# Patient Record
Sex: MALE | Race: WHITE | NOT HISPANIC OR LATINO | Employment: OTHER | URBAN - METROPOLITAN AREA
[De-identification: names, ages, dates, MRNs, and addresses within clinical notes are randomized per-mention and may not be internally consistent; named-entity substitution may affect disease eponyms.]

---

## 2018-03-19 ENCOUNTER — APPOINTMENT (OUTPATIENT)
Dept: MRI IMAGING | Facility: HOSPITAL | Age: 68
DRG: 069 | End: 2018-03-19
Payer: COMMERCIAL

## 2018-03-19 ENCOUNTER — HOSPITAL ENCOUNTER (INPATIENT)
Facility: HOSPITAL | Age: 68
LOS: 4 days | Discharge: HOME | DRG: 069 | End: 2018-03-24
Attending: EMERGENCY MEDICINE | Admitting: ANESTHESIOLOGY
Payer: COMMERCIAL

## 2018-03-19 ENCOUNTER — APPOINTMENT (EMERGENCY)
Dept: CT IMAGING | Facility: HOSPITAL | Age: 68
DRG: 069 | End: 2018-03-19
Payer: COMMERCIAL

## 2018-03-19 ENCOUNTER — APPOINTMENT (OUTPATIENT)
Dept: NON INVASIVE DIAGNOSTICS | Facility: HOSPITAL | Age: 68
DRG: 069 | End: 2018-03-19
Payer: COMMERCIAL

## 2018-03-19 ENCOUNTER — APPOINTMENT (EMERGENCY)
Dept: RADIOLOGY | Facility: HOSPITAL | Age: 68
DRG: 069 | End: 2018-03-19
Payer: COMMERCIAL

## 2018-03-19 DIAGNOSIS — R20.0 LEFT SIDED NUMBNESS: Primary | ICD-10-CM

## 2018-03-19 DIAGNOSIS — I16.1 HYPERTENSIVE EMERGENCY: ICD-10-CM

## 2018-03-19 DIAGNOSIS — I10 ESSENTIAL HYPERTENSION: Chronic | ICD-10-CM

## 2018-03-19 DIAGNOSIS — G45.9 TIA (TRANSIENT ISCHEMIC ATTACK): ICD-10-CM

## 2018-03-19 DIAGNOSIS — R51.9 HEADACHE: ICD-10-CM

## 2018-03-19 PROBLEM — N17.9 AKI (ACUTE KIDNEY INJURY) (HCC): Status: ACTIVE | Noted: 2018-03-19

## 2018-03-19 LAB
ALBUMIN SERPL BCP-MCNC: 4.4 G/DL (ref 3.5–5)
ALP SERPL-CCNC: 67 U/L (ref 46–116)
ALT SERPL W P-5'-P-CCNC: 35 U/L (ref 12–78)
ANION GAP SERPL CALCULATED.3IONS-SCNC: 10 MMOL/L (ref 4–13)
ANION GAP SERPL CALCULATED.3IONS-SCNC: 11 MMOL/L (ref 4–13)
ANION GAP SERPL CALCULATED.3IONS-SCNC: 9 MMOL/L (ref 4–13)
APTT PPP: 27 SECONDS (ref 23–35)
AST SERPL W P-5'-P-CCNC: 24 U/L (ref 5–45)
BILIRUB SERPL-MCNC: 0.8 MG/DL (ref 0.2–1)
BUN SERPL-MCNC: 19 MG/DL (ref 5–25)
BUN SERPL-MCNC: 21 MG/DL (ref 5–25)
BUN SERPL-MCNC: 23 MG/DL (ref 5–25)
CALCIUM SERPL-MCNC: 10 MG/DL (ref 8.3–10.1)
CALCIUM SERPL-MCNC: 10.1 MG/DL (ref 8.3–10.1)
CALCIUM SERPL-MCNC: 9.3 MG/DL (ref 8.3–10.1)
CHLORIDE SERPL-SCNC: 100 MMOL/L (ref 100–108)
CHLORIDE SERPL-SCNC: 100 MMOL/L (ref 100–108)
CHLORIDE SERPL-SCNC: 98 MMOL/L (ref 100–108)
CHOLEST SERPL-MCNC: 234 MG/DL (ref 50–200)
CO2 SERPL-SCNC: 26 MMOL/L (ref 21–32)
CO2 SERPL-SCNC: 29 MMOL/L (ref 21–32)
CO2 SERPL-SCNC: 30 MMOL/L (ref 21–32)
CREAT SERPL-MCNC: 1.11 MG/DL (ref 0.6–1.3)
CREAT SERPL-MCNC: 1.24 MG/DL (ref 0.6–1.3)
CREAT SERPL-MCNC: 1.41 MG/DL (ref 0.6–1.3)
ERYTHROCYTE [DISTWIDTH] IN BLOOD BY AUTOMATED COUNT: 13.4 % (ref 11.6–15.1)
ERYTHROCYTE [DISTWIDTH] IN BLOOD BY AUTOMATED COUNT: 13.5 % (ref 11.6–15.1)
ERYTHROCYTE [SEDIMENTATION RATE] IN BLOOD: 2 MM/HOUR (ref 0–10)
EST. AVERAGE GLUCOSE BLD GHB EST-MCNC: 126 MG/DL
GFR SERPL CREATININE-BSD FRML MDRD: 52 ML/MIN/1.73SQ M
GFR SERPL CREATININE-BSD FRML MDRD: 61 ML/MIN/1.73SQ M
GFR SERPL CREATININE-BSD FRML MDRD: 69 ML/MIN/1.73SQ M
GLUCOSE SERPL-MCNC: 121 MG/DL (ref 65–140)
GLUCOSE SERPL-MCNC: 135 MG/DL (ref 65–140)
GLUCOSE SERPL-MCNC: 136 MG/DL (ref 65–140)
HBA1C MFR BLD: 6 % (ref 4.2–6.3)
HCT VFR BLD AUTO: 47.7 % (ref 36.5–49.3)
HCT VFR BLD AUTO: 52.2 % (ref 36.5–49.3)
HDLC SERPL-MCNC: 43 MG/DL (ref 40–60)
HGB BLD-MCNC: 16.2 G/DL (ref 12–17)
HGB BLD-MCNC: 17.3 G/DL (ref 12–17)
INR PPP: 0.92 (ref 0.86–1.16)
LDLC SERPL CALC-MCNC: 176 MG/DL (ref 0–100)
MCH RBC QN AUTO: 31.9 PG (ref 26.8–34.3)
MCH RBC QN AUTO: 32.3 PG (ref 26.8–34.3)
MCHC RBC AUTO-ENTMCNC: 33.1 G/DL (ref 31.4–37.4)
MCHC RBC AUTO-ENTMCNC: 34 G/DL (ref 31.4–37.4)
MCV RBC AUTO: 95 FL (ref 82–98)
MCV RBC AUTO: 96 FL (ref 82–98)
PLATELET # BLD AUTO: 220 THOUSANDS/UL (ref 149–390)
PLATELET # BLD AUTO: 220 THOUSANDS/UL (ref 149–390)
PLATELET # BLD AUTO: 250 THOUSANDS/UL (ref 149–390)
PMV BLD AUTO: 10.2 FL (ref 8.9–12.7)
PMV BLD AUTO: 10.3 FL (ref 8.9–12.7)
PMV BLD AUTO: 10.3 FL (ref 8.9–12.7)
POTASSIUM SERPL-SCNC: 4.1 MMOL/L (ref 3.5–5.3)
POTASSIUM SERPL-SCNC: 4.3 MMOL/L (ref 3.5–5.3)
POTASSIUM SERPL-SCNC: 4.5 MMOL/L (ref 3.5–5.3)
PROT SERPL-MCNC: 8.4 G/DL (ref 6.4–8.2)
PROTHROMBIN TIME: 12.6 SECONDS (ref 12.1–14.4)
RBC # BLD AUTO: 5.02 MILLION/UL (ref 3.88–5.62)
RBC # BLD AUTO: 5.42 MILLION/UL (ref 3.88–5.62)
SODIUM SERPL-SCNC: 137 MMOL/L (ref 136–145)
SODIUM SERPL-SCNC: 138 MMOL/L (ref 136–145)
SODIUM SERPL-SCNC: 138 MMOL/L (ref 136–145)
TRIGL SERPL-MCNC: 74 MG/DL
TROPONIN I SERPL-MCNC: 0.04 NG/ML
TROPONIN I SERPL-MCNC: <0.02 NG/ML
WBC # BLD AUTO: 8.06 THOUSAND/UL (ref 4.31–10.16)
WBC # BLD AUTO: 9.85 THOUSAND/UL (ref 4.31–10.16)

## 2018-03-19 PROCEDURE — 93005 ELECTROCARDIOGRAM TRACING: CPT

## 2018-03-19 PROCEDURE — G8979 MOBILITY GOAL STATUS: HCPCS

## 2018-03-19 PROCEDURE — 83036 HEMOGLOBIN GLYCOSYLATED A1C: CPT | Performed by: PHYSICIAN ASSISTANT

## 2018-03-19 PROCEDURE — G8988 SELF CARE GOAL STATUS: HCPCS

## 2018-03-19 PROCEDURE — 96375 TX/PRO/DX INJ NEW DRUG ADDON: CPT

## 2018-03-19 PROCEDURE — 97166 OT EVAL MOD COMPLEX 45 MIN: CPT

## 2018-03-19 PROCEDURE — 85652 RBC SED RATE AUTOMATED: CPT | Performed by: PSYCHIATRY & NEUROLOGY

## 2018-03-19 PROCEDURE — 99220 PR INITIAL OBSERVATION CARE/DAY 70 MINUTES: CPT | Performed by: PHYSICIAN ASSISTANT

## 2018-03-19 PROCEDURE — 4A133B1 MONITORING OF ARTERIAL PRESSURE, PERIPHERAL, PERCUTANEOUS APPROACH: ICD-10-PCS | Performed by: ANESTHESIOLOGY

## 2018-03-19 PROCEDURE — 93306 TTE W/DOPPLER COMPLETE: CPT

## 2018-03-19 PROCEDURE — 85610 PROTHROMBIN TIME: CPT | Performed by: EMERGENCY MEDICINE

## 2018-03-19 PROCEDURE — 97163 PT EVAL HIGH COMPLEX 45 MIN: CPT

## 2018-03-19 PROCEDURE — 71046 X-RAY EXAM CHEST 2 VIEWS: CPT

## 2018-03-19 PROCEDURE — 85027 COMPLETE CBC AUTOMATED: CPT | Performed by: EMERGENCY MEDICINE

## 2018-03-19 PROCEDURE — 84484 ASSAY OF TROPONIN QUANT: CPT | Performed by: EMERGENCY MEDICINE

## 2018-03-19 PROCEDURE — 99285 EMERGENCY DEPT VISIT HI MDM: CPT

## 2018-03-19 PROCEDURE — 36415 COLL VENOUS BLD VENIPUNCTURE: CPT | Performed by: EMERGENCY MEDICINE

## 2018-03-19 PROCEDURE — 80053 COMPREHEN METABOLIC PANEL: CPT | Performed by: EMERGENCY MEDICINE

## 2018-03-19 PROCEDURE — 99254 IP/OBS CNSLTJ NEW/EST MOD 60: CPT | Performed by: PHYSICAL MEDICINE & REHABILITATION

## 2018-03-19 PROCEDURE — 85049 AUTOMATED PLATELET COUNT: CPT | Performed by: PHYSICIAN ASSISTANT

## 2018-03-19 PROCEDURE — 03HY32Z INSERTION OF MONITORING DEVICE INTO UPPER ARTERY, PERCUTANEOUS APPROACH: ICD-10-PCS | Performed by: ANESTHESIOLOGY

## 2018-03-19 PROCEDURE — G8978 MOBILITY CURRENT STATUS: HCPCS

## 2018-03-19 PROCEDURE — 99254 IP/OBS CNSLTJ NEW/EST MOD 60: CPT | Performed by: PSYCHIATRY & NEUROLOGY

## 2018-03-19 PROCEDURE — G8987 SELF CARE CURRENT STATUS: HCPCS

## 2018-03-19 PROCEDURE — 4A133J1 MONITORING OF ARTERIAL PULSE, PERIPHERAL, PERCUTANEOUS APPROACH: ICD-10-PCS | Performed by: ANESTHESIOLOGY

## 2018-03-19 PROCEDURE — 84484 ASSAY OF TROPONIN QUANT: CPT | Performed by: NURSE PRACTITIONER

## 2018-03-19 PROCEDURE — 80048 BASIC METABOLIC PNL TOTAL CA: CPT | Performed by: NURSE PRACTITIONER

## 2018-03-19 PROCEDURE — 85730 THROMBOPLASTIN TIME PARTIAL: CPT | Performed by: EMERGENCY MEDICINE

## 2018-03-19 PROCEDURE — 85027 COMPLETE CBC AUTOMATED: CPT | Performed by: PHYSICIAN ASSISTANT

## 2018-03-19 PROCEDURE — 80048 BASIC METABOLIC PNL TOTAL CA: CPT | Performed by: PHYSICIAN ASSISTANT

## 2018-03-19 PROCEDURE — 96374 THER/PROPH/DIAG INJ IV PUSH: CPT

## 2018-03-19 PROCEDURE — 80061 LIPID PANEL: CPT | Performed by: PHYSICIAN ASSISTANT

## 2018-03-19 PROCEDURE — 70498 CT ANGIOGRAPHY NECK: CPT

## 2018-03-19 PROCEDURE — 99254 IP/OBS CNSLTJ NEW/EST MOD 60: CPT | Performed by: INTERNAL MEDICINE

## 2018-03-19 PROCEDURE — 70496 CT ANGIOGRAPHY HEAD: CPT

## 2018-03-19 RX ORDER — OXYCODONE HYDROCHLORIDE AND ACETAMINOPHEN 5; 325 MG/1; MG/1
1 TABLET ORAL ONCE
Status: COMPLETED | OUTPATIENT
Start: 2018-03-19 | End: 2018-03-19

## 2018-03-19 RX ORDER — NITROGLYCERIN 20 MG/100ML
5-200 INJECTION INTRAVENOUS
Status: DISCONTINUED | OUTPATIENT
Start: 2018-03-19 | End: 2018-03-19

## 2018-03-19 RX ORDER — ASPIRIN 325 MG
325 TABLET ORAL ONCE
Status: COMPLETED | OUTPATIENT
Start: 2018-03-19 | End: 2018-03-19

## 2018-03-19 RX ORDER — ONDANSETRON 2 MG/ML
4 INJECTION INTRAMUSCULAR; INTRAVENOUS EVERY 6 HOURS PRN
Status: DISCONTINUED | OUTPATIENT
Start: 2018-03-19 | End: 2018-03-24 | Stop reason: HOSPADM

## 2018-03-19 RX ORDER — HYDRALAZINE HYDROCHLORIDE 20 MG/ML
10 INJECTION INTRAMUSCULAR; INTRAVENOUS EVERY 4 HOURS PRN
Status: DISCONTINUED | OUTPATIENT
Start: 2018-03-19 | End: 2018-03-19

## 2018-03-19 RX ORDER — MORPHINE SULFATE 2 MG/ML
2 INJECTION, SOLUTION INTRAMUSCULAR; INTRAVENOUS ONCE
Status: DISCONTINUED | OUTPATIENT
Start: 2018-03-19 | End: 2018-03-21

## 2018-03-19 RX ORDER — HEPARIN SODIUM 5000 [USP'U]/ML
5000 INJECTION, SOLUTION INTRAVENOUS; SUBCUTANEOUS EVERY 8 HOURS SCHEDULED
Status: DISCONTINUED | OUTPATIENT
Start: 2018-03-19 | End: 2018-03-24 | Stop reason: HOSPADM

## 2018-03-19 RX ORDER — ASPIRIN 325 MG
325 TABLET ORAL DAILY
Status: DISCONTINUED | OUTPATIENT
Start: 2018-03-19 | End: 2018-03-24 | Stop reason: HOSPADM

## 2018-03-19 RX ORDER — LIDOCAINE HYDROCHLORIDE 10 MG/ML
1 INJECTION, SOLUTION EPIDURAL; INFILTRATION; INTRACAUDAL; PERINEURAL ONCE
Status: COMPLETED | OUTPATIENT
Start: 2018-03-19 | End: 2018-03-19

## 2018-03-19 RX ORDER — SODIUM CHLORIDE 9 MG/ML
75 INJECTION, SOLUTION INTRAVENOUS CONTINUOUS
Status: DISCONTINUED | OUTPATIENT
Start: 2018-03-19 | End: 2018-03-20

## 2018-03-19 RX ORDER — ACETAMINOPHEN 325 MG/1
650 TABLET ORAL EVERY 4 HOURS PRN
Status: DISCONTINUED | OUTPATIENT
Start: 2018-03-19 | End: 2018-03-22

## 2018-03-19 RX ORDER — LIDOCAINE HYDROCHLORIDE 10 MG/ML
1 INJECTION, SOLUTION INFILTRATION; PERINEURAL ONCE
Status: DISCONTINUED | OUTPATIENT
Start: 2018-03-19 | End: 2018-03-19

## 2018-03-19 RX ORDER — NITROGLYCERIN 0.4 MG/1
0.4 TABLET SUBLINGUAL
Status: DISCONTINUED | OUTPATIENT
Start: 2018-03-19 | End: 2018-03-24 | Stop reason: HOSPADM

## 2018-03-19 RX ORDER — ATORVASTATIN CALCIUM 40 MG/1
40 TABLET, FILM COATED ORAL EVERY EVENING
Status: DISCONTINUED | OUTPATIENT
Start: 2018-03-19 | End: 2018-03-24 | Stop reason: HOSPADM

## 2018-03-19 RX ORDER — LIDOCAINE HYDROCHLORIDE 10 MG/ML
INJECTION, SOLUTION EPIDURAL; INFILTRATION; INTRACAUDAL; PERINEURAL
Status: COMPLETED
Start: 2018-03-19 | End: 2018-03-19

## 2018-03-19 RX ORDER — LABETALOL HYDROCHLORIDE 5 MG/ML
10 INJECTION, SOLUTION INTRAVENOUS ONCE
Status: COMPLETED | OUTPATIENT
Start: 2018-03-19 | End: 2018-03-19

## 2018-03-19 RX ORDER — METOCLOPRAMIDE HYDROCHLORIDE 5 MG/ML
10 INJECTION INTRAMUSCULAR; INTRAVENOUS ONCE
Status: COMPLETED | OUTPATIENT
Start: 2018-03-19 | End: 2018-03-19

## 2018-03-19 RX ORDER — OXYCODONE HYDROCHLORIDE AND ACETAMINOPHEN 5; 325 MG/1; MG/1
1 TABLET ORAL EVERY 4 HOURS PRN
Status: DISCONTINUED | OUTPATIENT
Start: 2018-03-19 | End: 2018-03-20

## 2018-03-19 RX ADMIN — HEPARIN SODIUM 5000 UNITS: 5000 INJECTION, SOLUTION INTRAVENOUS; SUBCUTANEOUS at 15:46

## 2018-03-19 RX ADMIN — SODIUM CHLORIDE 5 MG/HR: 0.9 INJECTION, SOLUTION INTRAVENOUS at 19:17

## 2018-03-19 RX ADMIN — ACETAMINOPHEN 650 MG: 325 TABLET, FILM COATED ORAL at 18:29

## 2018-03-19 RX ADMIN — ACETAMINOPHEN 650 MG: 325 TABLET, FILM COATED ORAL at 13:14

## 2018-03-19 RX ADMIN — HYDRALAZINE HYDROCHLORIDE 10 MG: 20 INJECTION INTRAMUSCULAR; INTRAVENOUS at 13:23

## 2018-03-19 RX ADMIN — IODIXANOL 100 ML: 320 INJECTION, SOLUTION INTRAVASCULAR at 01:53

## 2018-03-19 RX ADMIN — OXYCODONE HYDROCHLORIDE AND ACETAMINOPHEN 1 TABLET: 5; 325 TABLET ORAL at 20:07

## 2018-03-19 RX ADMIN — SODIUM CHLORIDE 75 ML/HR: 0.9 INJECTION, SOLUTION INTRAVENOUS at 06:29

## 2018-03-19 RX ADMIN — METOCLOPRAMIDE 10 MG: 5 INJECTION, SOLUTION INTRAMUSCULAR; INTRAVENOUS at 01:12

## 2018-03-19 RX ADMIN — LABETALOL 20 MG/4 ML (5 MG/ML) INTRAVENOUS SYRINGE 10 MG: at 01:06

## 2018-03-19 RX ADMIN — ASPIRIN 325 MG: 325 TABLET ORAL at 03:26

## 2018-03-19 RX ADMIN — OXYCODONE HYDROCHLORIDE AND ACETAMINOPHEN 1 TABLET: 5; 325 TABLET ORAL at 19:16

## 2018-03-19 RX ADMIN — HYDROMORPHONE HYDROCHLORIDE 0.5 MG: 1 INJECTION, SOLUTION INTRAMUSCULAR; INTRAVENOUS; SUBCUTANEOUS at 03:27

## 2018-03-19 RX ADMIN — NITROGLYCERIN 10 MCG/MIN: 20 INJECTION INTRAVENOUS at 16:50

## 2018-03-19 RX ADMIN — METOPROLOL TARTRATE 25 MG: 25 TABLET ORAL at 08:28

## 2018-03-19 RX ADMIN — LIDOCAINE HYDROCHLORIDE 1 ML: 10 INJECTION, SOLUTION EPIDURAL; INFILTRATION; INTRACAUDAL; PERINEURAL at 18:20

## 2018-03-19 RX ADMIN — SODIUM CHLORIDE 75 ML/HR: 0.9 INJECTION, SOLUTION INTRAVENOUS at 19:28

## 2018-03-19 RX ADMIN — ATORVASTATIN CALCIUM 40 MG: 40 TABLET, FILM COATED ORAL at 18:29

## 2018-03-19 NOTE — PHYSICAL THERAPY NOTE
Physical Therapy Cancellation Note    PT order received  Chart review performed  At this time, PT evaluation cancelled presently 2* elevated BP, discussed with OMAR Parker who is in process of administering meds to pt at this time  PT will follow and evaluate as appropriate      Dickson Spangler, PT, DPT

## 2018-03-19 NOTE — CONSULTS
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 o  year old male who has a history of CKD3, HTN, HLD presenting with accelerated BP in the setting of chest pain and stroke-like symptoms  Had headaches for 2 weeks, developed substernal chest pressure with L face and L arm numbness and tingling  Per wife, patient also had some slurred speech  Has occasional leg swelling  Denies SOB, palpitations  No prior history of CVA, no cardiac history  Patient arrived in ED with /134, but BP has been labile since admission today  Patient had diffuse ischemic EKG changes after receiving hydralazine  Has been compliant on medications at home  REVIEW OF SYSTEMS:  Constitutional:  Denies fever or chills   Eyes:  Denies change in visual acuity   HENT:  Denies nasal congestion or sore throat   Respiratory:  Denies cough or shortness of breath   Cardiovascular:  +chest pain  +leg swelling  GI:  Denies abdominal pain, nausea, vomiting, bloody stools or diarrhea   :  Denies dysuria, frequency, difficulty in micturition and nocturia  Musculoskeletal:  Denies back pain or joint pain   Neurologic:  +headache, +L arm numbness/tingling  Endocrine:  Denies polyuria or polydipsia   Lymphatic:  Denies swollen glands   Psychiatric:  Denies depression or anxiety     Historical Information   Past Medical History:   Diagnosis Date    Hyperlipidemia      History reviewed  No pertinent surgical history  History   Alcohol Use No     History   Drug Use No     History   Smoking Status    Never Smoker   Smokeless Tobacco    Never Used       Family History: History reviewed   No pertinent family history  MEDS & ALLERGIES:  all current active meds have been reviewed and current meds: Current Facility-Administered Medications   Medication Dose Route Frequency    acetaminophen (TYLENOL) tablet 650 mg  650 mg Oral Q4H PRN    aspirin tablet 325 mg  325 mg Oral Daily    atorvastatin (LIPITOR) tablet 40 mg  40 mg Oral QPM    heparin (porcine) subcutaneous injection 5,000 Units  5,000 Units Subcutaneous Q8H [facility]    metoprolol tartrate (LOPRESSOR) tablet 25 mg  25 mg Oral Q12H [facility]    ondansetron (ZOFRAN) injection 4 mg  4 mg Intravenous Q6H PRN    sodium chloride 0 9 % infusion  75 mL/hr Intravenous Continuous       sodium chloride 75 mL/hr Last Rate: 75 mL/hr (03/19/18 0629)     Allergies   Allergen Reactions    Valium [Diazepam] Dizziness       OBJECTIVE:  Vitals:   Vitals:    03/19/18 1414   BP: (!) 187/94   Pulse: 74   Resp: (!) 26   Temp:    SpO2: 98%     Body mass index is 48 94 kg/m²  Systolic (81KYM), NPI:418 , Min:147 , XPM:239     Diastolic (89CCO), XOI:71, Min:76, Max:134    No intake or output data in the 24 hours ending 03/19/18 1544  Weight (last 2 days)     Date/Time   Weight    03/19/18 0037  (!)  146 (321 87)            Invasive Devices     Peripheral Intravenous Line            Peripheral IV 03/19/18 Left Arm less than 1 day                PHYSICAL EXAMS:  General:  Patient is not in acute distress, laying in the bed comfortably, awake, alert responding to commands  Head: Normocephalic, Atraumatic  HEENT: White sclera, pink conjunctiva,  PERRLA,pharynx benign  Neck:  Supple, no neck vein distention, carotids+2/+2 no bruits, thyromegaly, adenopathy  Respiratory: clear to P/A  Cardiovascular:  PMI normal, S1-S2 normal, No  Murmurs, thrills, gallops, rubs   Regular rhythm  GI:  Abdomen soft nontender   No hepatosplenomegaly, adenopathy, ascites,or rebound tenderness  Extremities: No edema, normal pulses, no calf tenderness, no joint deformities, no venous disease Integument:  No skin rashes or ulceration  Lymphatic:  No cervical or inguinal lymphadenopathy  Neurologic:  Patient is awake alert, responding to command, well-oriented to time and place and person moving all extremities    LABORATORY RESULTS:    Results from last 7 days  Lab Units 03/19/18  0104   TROPONIN I ng/mL <0 02     CBC with diff:   Results from last 7 days  Lab Units 03/19/18  0627 03/19/18  0104   WBC Thousand/uL 8 06 9 85   HEMOGLOBIN g/dL 16 2 17 3*   HEMATOCRIT % 47 7 52 2*   MCV fL 95 96   PLATELETS Thousands/uL 220  220 250   MCH pg 32 3 31 9   MCHC g/dL 34 0 33 1   RDW % 13 5 13 4   MPV fL 10 3  10 3 10 2       CMP:  Results from last 7 days  Lab Units 03/19/18  0627 03/19/18  0104   SODIUM mmol/L 138 138   POTASSIUM mmol/L 4 3 4 1   CHLORIDE mmol/L 100 98*   CO2 mmol/L 29 30   ANION GAP mmol/L 9 10   BUN mg/dL 21 23   CREATININE mg/dL 1 24 1 41*   GLUCOSE RANDOM mg/dL 135 136   CALCIUM mg/dL 9 3 10 1   AST U/L  --  24   ALT U/L  --  35   ALK PHOS U/L  --  67   TOTAL PROTEIN g/dL  --  8 4*   BILIRUBIN TOTAL mg/dL  --  0 80   EGFR ml/min/1 73sq m 61 52       BMP:  Results from last 7 days  Lab Units 03/19/18  0627 03/19/18  0104   SODIUM mmol/L 138 138   POTASSIUM mmol/L 4 3 4 1   CHLORIDE mmol/L 100 98*   CO2 mmol/L 29 30   BUN mg/dL 21 23   CREATININE mg/dL 1 24 1 41*   GLUCOSE RANDOM mg/dL 135 136   CALCIUM mg/dL 9 3 10 1                  Results from last 7 days  Lab Units 03/19/18  0627   HEMOGLOBIN A1C % 6 0           Results from last 7 days  Lab Units 03/19/18  0104   INR  0 92       Lipid Profile:   Lab Results   Component Value Date    CHOL 234 (H) 03/19/2018     Lab Results   Component Value Date    HDL 43 03/19/2018     Lab Results   Component Value Date    LDLCALC 176 (H) 03/19/2018     Lab Results   Component Value Date    TRIG 74 03/19/2018       Cardiac testing:   No results found for this or any previous visit  No results found for this or any previous visit    No procedure found  No results found for this or any previous visit  Imaging:   I have personally reviewed pertinent reports  EKG reviewed personally:  1st EKG showed flipped T waves in leads I and aVL, repeat EKG shows diffuse T wave inversions in lateral and inferior leads  Assessment/Plan:  1  HTN emergency: BP initially peaked at 270/134  Last recorded /94  Will be in ICU for further management  Recommend nitroglycerin drip  Recommend Coreg 12 5 mg BID  Recommend renal doppler  2  Chest pain: Ischemic EKG changes as above  Initial troponin negative, continue to trend  Chest pain likely secondary to HTN emergency  Will need BP control  ECHO ordered, will assess EF and regional wall motion abnormalities  Further ischemic workup after BP is controlled  Continue ASA, statin  3  Stroke-like symptoms: Per neurology, TIA vs complicated migraine  Neurology following, MRI pending  HTN emergency may be a contributing factor  ECHO ordered as above, no evidence of A Fib thus far  Continue ASA, statin  4  HLD: Continue statin    Code Status: Level 1 - Full Code    Counseling / Coordination of Care  Total floor / unit time spent today 35 minutes  Greater than 50% of total time was spent with the patient and / or family counseling and / or coordination of care  A description of the counseling / coordination of care: Review of history, current assessment, development of a plan      Kathya Suero PA-C  6/66/6483,6:60 PM

## 2018-03-19 NOTE — SPEECH THERAPY NOTE
Consult received, chart reviewed  Pt initially presented with s/sx concerning for CVA, however, head CT is negative for acute infarct, current working dx per neurology is TIA vs  complex migraine  D/w OMAR Crabtree who reports that pt passed dysphagia screening and p/w no apparent deficits in speech, language, or swallowing  Pt likewise denies related complaints and p/w no obvious deficits on bedside screening  Full ST eval is not warranted at this time  Please reconsult with any concerns

## 2018-03-19 NOTE — ED PROVIDER NOTES
History  Chief Complaint   Patient presents with    Numbness     pt co of L face and hand numbness onset tonight about 9pm  "i have had a headache for about 2 weeks now"  74 y o  male presents with 3 weeks of right sided frontal headache without radiation  Described as severe stabbing that waxes and wanes and continues in the ER  Patient states food worsens the pain and acetaminophen improves the pain  Patient denies aura  Patient denies maximal intensity of the headache within minutes of onset  Patient denies exertional component to the headache  Patient notes numbness in the left hand and face that started in the thumb and lasted several minutes around 8pm and resolved, currently no numbness  Patient taking excessive amounts of acetaminophen  ROS: patient affirms right sided chest pain earlier that has improved and gait difficulty that he describes as "stubbling"; denies fever/chills, seizure, weight loss, confusion, focal weakness, diaphoresis, visual changes, photophobia, nausea/vomiting, neck pain/stiffness, diarrhea, facial pain, speech difficulty, weakness, vertigo, lightheadedness, jaw claudication, syncope  Patient denies any concerns for CO exposure, recent tick bites, cervical manipulation, or trauma  Patient denies any use of illicit drugs  Patient affirms a history of remote headaches as a child  Patient denies any history of connective tissue disorders  Patient denies any history or family history of SAH  Patient denies any history of immunocompromised state  Objective:   PHYSICAL EXAM:   Constitutional : Non-toxic  Well developed, well-nourished with no acute distress   Eyes: PERRL, EOMI  Normal fundoscopic exam with no signs of papilledema or retinal hemorrhage  No nystagmus with gaze fixation  HENT: Atraumatic  Pharynx moist and non-erythematous  No tenderness or swelling over the temporal arteries     Neck: no carotid bruit, no tenderness to palpitation   CV: Regular rate and rhythm, no murmur  Peripheral pulses intact   Respiratory: Lungs clear to auscultation bilaterally   Abdomen: Soft, non-tender, non-distended  Back: No vertebral tenderness   Skin: Normal color, warm and dry   Extremities: Non-tender, no pedal edema  Neuro: Alert and answers questions appropriately  Normal tandem gait, including toe walking and heel walking  Normal Romberg exam  No pronator drift  Normal finger to nose  Normal fine motor function with rapid finger movements  Normal hand tap  Cranial nerves II through XII grossly intact  Visual fields grossly intact  Upper and lower motor strength 5/5 and symmetric  Normal light touch sensory exam  Normal DTRs  Medical Decision Making   62-year-old male presenting with headache associated with intermittent neurologic findings  Patient states headache has acutely worsening today that has been progressive over the past 2 weeks  As such, will obtain CTA of patient's head and neck to evaluate for potential intracranial process or vasculopathy including vertebral dissection that could be affecting patient's symptoms  Patient's NIH score is currently 0, as such patient is not a candidate for IV thrombolytics as his neurologic symptoms have resolved prior to my evaluation  Will treat patient symptomatically for his headache including antihypertensives though will tolerate permissive hypertension without significant reduction in overall systolic considering potential for intracranial process and presuming no intracranial hemorrhage found on CT imaging  Will admit for continued monitoring and evaluation considering possible TIA and hypertensive emergency  None       Past Medical History:   Diagnosis Date    Hyperlipidemia        History reviewed  No pertinent surgical history  History reviewed  No pertinent family history  I have reviewed and agree with the history as documented      Social History   Substance Use Topics    Smoking status: Never Smoker    Smokeless tobacco: Never Used    Alcohol use No        Review of Systems   All other systems reviewed and are negative        Physical Exam  ED Triage Vitals   Temperature Pulse Respirations Blood Pressure SpO2   03/19/18 0037 03/19/18 0037 03/19/18 0037 03/19/18 0038 03/19/18 0038   98 °F (36 7 °C) 70 20 (!) 270/134 95 %      Temp Source Heart Rate Source Patient Position - Orthostatic VS BP Location FiO2 (%)   03/19/18 0037 03/19/18 0037 03/19/18 0037 03/19/18 0037 --   Oral Monitor Lying Right arm       Pain Score       03/19/18 0037       No Pain           Orthostatic Vital Signs  Vitals:    03/21/18 0709 03/21/18 0915 03/21/18 1108 03/21/18 1500   BP: (!) 202/87 (!) 183/76 (!) 185/79 (!) 184/83   Pulse: 88 74 89 96   Patient Position - Orthostatic VS: Lying  Lying        Physical Exam    ED Medications  Medications   atorvastatin (LIPITOR) tablet 40 mg (40 mg Oral Given 3/21/18 1743)   ondansetron (ZOFRAN) injection 4 mg (not administered)   acetaminophen (TYLENOL) tablet 650 mg (650 mg Oral Given 3/19/18 1829)   aspirin tablet 325 mg (325 mg Oral Given 3/21/18 0917)   heparin (porcine) subcutaneous injection 5,000 Units (5,000 Units Subcutaneous Given 3/21/18 1322)   nitroglycerin (NITROSTAT) SL tablet 0 4 mg (not administered)   LORazepam (ATIVAN) 2 mg/mL injection **AcuDose Override Pull** (  Not Given 3/20/18 1229)   acetaminophen (TYLENOL) tablet 650 mg (650 mg Oral Given 3/21/18 1743)   amLODIPine (NORVASC) tablet 5 mg (5 mg Oral Not Given 3/21/18 0810)   hydrALAZINE (APRESOLINE) injection 5 mg ( Intravenous MAR Hold 3/21/18 1507)   labetalol (NORMODYNE) injection 10 mg (10 mg Intravenous Given 3/19/18 0106)   metoclopramide (REGLAN) injection 10 mg (10 mg Intravenous Given 3/19/18 0112)   iodixanol (VISIPAQUE) 320 MG/ML injection 100 mL (100 mL Intravenous Given 3/19/18 2797)   HYDROmorphone (DILAUDID) injection 0 5 mg (0 5 mg Intravenous Given 3/19/18 7093)   aspirin tablet 325 mg (325 mg Oral Given 3/19/18 0326)   lidocaine (PF) (XYLOCAINE-MPF) 1 % injection 1 mL (1 mL Infiltration Given 3/19/18 1820)   oxyCODONE-acetaminophen (PERCOCET) 5-325 mg per tablet 1 tablet (1 tablet Oral Given 3/19/18 2007)   LORazepam (ATIVAN) 2 mg/mL injection 0 5 mg (0 5 mg Intravenous Given 3/20/18 1101)       Diagnostic Studies  Results Reviewed     Procedure Component Value Units Date/Time    Troponin I [[id number]]  (Normal) Collected:  03/19/18 2238    Lab Status:  Final result Specimen:  Blood from Line, Arterial Updated:  03/19/18 2316     Troponin I 0 04 ng/mL     Narrative:         [employer] Chemistry analyzer 99% cutoff is > 0 04 ng/mL in network labs    o cTnI 99% cutoff is useful only when applied to patients in the clinical setting of myocardial ischemia  o cTnI 99% cutoff should be interpreted in the context of clinical history, ECG findings and possibly cardiac imaging to establish correct diagnosis  o cTnI 99% cutoff may be suggestive but clearly not indicative of a coronary event without the clinical setting of myocardial ischemia  Troponin I [[id number]]  (Normal) Collected:  03/19/18 1942    Lab Status:  Final result Specimen:  Blood Updated:  03/19/18 2002     Troponin I <0 02 ng/mL     Narrative:         [employer] Chemistry analyzer 99% cutoff is > 0 04 ng/mL in network labs    o cTnI 99% cutoff is useful only when applied to patients in the clinical setting of myocardial ischemia  o cTnI 99% cutoff should be interpreted in the context of clinical history, ECG findings and possibly cardiac imaging to establish correct diagnosis  o cTnI 99% cutoff may be suggestive but clearly not indicative of a coronary event without the clinical setting of myocardial ischemia      Troponin I [[id number]]  (Normal) Collected:  03/19/18 1546    Lab Status:  Final result Specimen:  Blood from Arm, Left Updated:  03/19/18 1621     Troponin I <0 02 ng/mL     Narrative:         [employer] Chemistry analyzer 99% cutoff is > 0 04 ng/mL in network labs    o cTnI 99% cutoff is useful only when applied to patients in the clinical setting of myocardial ischemia  o cTnI 99% cutoff should be interpreted in the context of clinical history, ECG findings and possibly cardiac imaging to establish correct diagnosis  o cTnI 99% cutoff may be suggestive but clearly not indicative of a coronary event without the clinical setting of myocardial ischemia  Basic metabolic panel [[id number]] Collected:  03/19/18 1546    Lab Status:  Final result Specimen:  Blood from Arm, Left Updated:  03/19/18 1608     Sodium 137 mmol/L      Potassium 4 5 mmol/L      Chloride 100 mmol/L      CO2 26 mmol/L      Anion Gap 11 mmol/L      BUN 19 mg/dL      Creatinine 1 11 mg/dL      Glucose 121 mg/dL      Calcium 10 0 mg/dL      eGFR 69 ml/min/1 73sq m     Narrative:         National Kidney Disease Education Program recommendations are as follows:  GFR calculation is accurate only with a steady state creatinine  Chronic Kidney disease less than 60 ml/min/1 73 sq  meters  Kidney failure less than 15 ml/min/1 73 sq  meters      Hemoglobin A1c w/EAG Estimation [[id number]]  (Normal) Collected:  03/19/18 0627    Lab Status:  Final result Specimen:  Blood from Arm, Left Updated:  03/19/18 1245     Hemoglobin A1C 6 0 %       mg/dl     Sedimentation rate, automated [[id number]]  (Normal) Collected:  03/19/18 1034    Lab Status:  Final result Specimen:  Blood from Arm, Left Updated:  03/19/18 1138     Sed Rate 2 mm/hour     Lipid Panel with Direct LDL reflex [[id number]]  (Abnormal) Collected:  03/19/18 2097    Lab Status:  Final result Specimen:  Blood from Arm, Left Updated:  03/19/18 0657     Cholesterol 234 (H) mg/dL      Triglycerides 74 mg/dL      HDL, Direct 43 mg/dL      LDL Calculated 176 (H) mg/dL     Narrative:         Triglyceride:        Normal               <150 mg/dl        Borderline High     150-199 mg/dl        High               200-499 mg/dl        Very High           >499 mg/dl      Basic metabolic panel [[id number]] Collected:  03/19/18 8670    Lab Status:  Final result Specimen:  Blood from Arm, Left Updated:  03/19/18 8671     Sodium 138 mmol/L      Potassium 4 3 mmol/L      Chloride 100 mmol/L      CO2 29 mmol/L      Anion Gap 9 mmol/L      BUN 21 mg/dL      Creatinine 1 24 mg/dL      Glucose 135 mg/dL      Calcium 9 3 mg/dL      eGFR 61 ml/min/1 73sq m     Narrative:         National Kidney Disease Education Program recommendations are as follows:  GFR calculation is accurate only with a steady state creatinine  Chronic Kidney disease less than 60 ml/min/1 73 sq  meters  Kidney failure less than 15 ml/min/1 73 sq  meters  CBC (With Platelets) [[id number]]  (Normal) Collected:  03/19/18 0627    Lab Status:  Final result Specimen:  Blood from Arm, Left Updated:  03/19/18 0637     WBC 8 06 Thousand/uL      RBC 5 02 Million/uL      Hemoglobin 16 2 g/dL      Hematocrit 47 7 %      MCV 95 fL      MCH 32 3 pg      MCHC 34 0 g/dL      RDW 13 5 %      Platelets 255 Thousands/uL      MPV 10 3 fL     Platelet count [[id number]]  (Normal) Collected:  03/19/18 9465    Lab Status:  Final result Specimen:  Blood from Arm, Left Updated:  03/19/18 9132     Platelets 660 Thousands/uL      MPV 10 3 fL     Troponin I [[id number]]  (Normal) Collected:  03/19/18 0104    Lab Status:  Final result Specimen:  Blood from Arm, Left Updated:  03/19/18 0158     Troponin I <0 02 ng/mL     Narrative:         [employer] Chemistry analyzer 99% cutoff is > 0 04 ng/mL in network labs    o cTnI 99% cutoff is useful only when applied to patients in the clinical setting of myocardial ischemia  o cTnI 99% cutoff should be interpreted in the context of clinical history, ECG findings and possibly cardiac imaging to establish correct diagnosis  o cTnI 99% cutoff may be suggestive but clearly not indicative of a coronary event without the clinical setting of myocardial ischemia      Comprehensive metabolic panel [[id number]]  (Abnormal) Collected:  03/19/18 0104    Lab Status:  Final result Specimen:  Blood from Arm, Left Updated:  03/19/18 0154     Sodium 138 mmol/L      Potassium 4 1 mmol/L      Chloride 98 (L) mmol/L      CO2 30 mmol/L      Anion Gap 10 mmol/L      BUN 23 mg/dL      Creatinine 1 41 (H) mg/dL      Glucose 136 mg/dL      Calcium 10 1 mg/dL      AST 24 U/L      ALT 35 U/L      Alkaline Phosphatase 67 U/L      Total Protein 8 4 (H) g/dL      Albumin 4 4 g/dL      Total Bilirubin 0 80 mg/dL      eGFR 52 ml/min/1 73sq m     Narrative:         National Kidney Disease Education Program recommendations are as follows:  GFR calculation is accurate only with a steady state creatinine  Chronic Kidney disease less than 60 ml/min/1 73 sq  meters  Kidney failure less than 15 ml/min/1 73 sq  meters  APTT [[id number]]  (Normal) Collected:  03/19/18 0104    Lab Status:  Final result Specimen:  Blood from Arm, Left Updated:  03/19/18 0136     PTT 27 seconds     Narrative: Therapeutic Heparin Range = 60-90 seconds    Protime-INR [[id number]]  (Normal) Collected:  03/19/18 0104    Lab Status:  Final result Specimen:  Blood from Arm, Left Updated:  03/19/18 0136     Protime 12 6 seconds      INR 0 92    CBC [[id number]]  (Abnormal) Collected:  03/19/18 0104    Lab Status:  Final result Specimen:  Blood from Arm, Left Updated:  03/19/18 0124     WBC 9 85 Thousand/uL      RBC 5 42 Million/uL      Hemoglobin 17 3 (H) g/dL      Hematocrit 52 2 (H) %      MCV 96 fL      MCH 31 9 pg      MCHC 33 1 g/dL      RDW 13 4 %      Platelets 326 Thousands/uL      MPV 10 2 fL                  MRI brain wo contrast   Final Result by Stephanie Lema MD (03/20 0499)   No significant focal brain parenchymal abnormalities      Consistent with prior CTA               Workstation performed: [id number]         X-ray chest 2 views   Final Result by Kar Ventura MD (03/19 1002)      No acute cardiopulmonary disease              Workstation performed: [id number]         CTA head and neck with and without contrast   ED Interpretation by Zbigniew Longo MD (03/19 1004)   IMPRESSION:   No acute findings  IMPRESSION:   No acute findings  Final Result by Karissa Springer MD (03/19 4580)      No acute intracranial abnormality  No focal stenosis or saccular aneurysm within the [place]  No hemodynamically significant stenosis within either common or internal carotid artery  Less than 50% stenosis by NASCET criteria  22 mm left thyroid lobe nodule  A nonemergent thyroid ultrasound is recommended for further characterization  No prior studies are available for comparison  Findings are consistent with the preliminary report from [facility] which was provided shortly after completion of the exam  The additional finding of the left thyroid lobe nodule will be communicated by the radiology  liaison  Workstation performed: [id number]                    Procedures  Procedures       Phone Contacts  ED Phone Contact    ED Course  ED Course as of Mar 21 2042   Mon Mar 19, 2018   0143 Extensive conversation with the patient about risks of contrast from CTA of the head  Discussed risks benefits considering patient's history of kidney disease    After these discussions, patient agreeable to CTA imaging understanding the risks associated with CT imaging with contrast               NIH Stroke Scale    Flowsheet Row Most Recent Value   Level of Consciousness (1a )  0 Filed at: 03/19/2018 0400   LOC Questions (1b )  0 Filed at: 03/19/2018 0400   LOC Commands (1c )  0 Filed at: 03/19/2018 0400   Best Gaze (2 )  0 Filed at: 03/19/2018 0400   Visual (3 )  0 Filed at: 03/19/2018 0400   Facial Palsy (4 )  0 Filed at: 03/19/2018 0400   Motor Arm, Left (5a )  0 Filed at: 03/19/2018 0400   Motor Arm, Right (5b )  0 Filed at: 03/19/2018 0400   Motor Leg, Left (6a )  0 Filed at: 03/19/2018 0400   Motor Leg, Right (6b ) 0 Filed at: 03/19/2018 0400   Limb Ataxia (7 )  0 Filed at: 03/19/2018 0400   Sensory (8 )  0 Filed at: 03/19/2018 0400   Best Language (9 )  0 Filed at: 03/19/2018 0400   Dysarthria (10 )  0 Filed at: 03/19/2018 0400   Extinction and Inattention (11 ) (Formerly Neglect)  0 Filed at: 03/19/2018 0400   Total  0 Filed at: 03/19/2018 0400                        MDM  CritCare Time    Disposition  Final diagnoses:   Left sided numbness   TIA (transient ischemic attack)   Headache     Time reflects when diagnosis was documented in both MDM as applicable and the Disposition within this note     Time User Action Codes Description Comment    3/19/2018  3:12 AM Geneva Favorite Add [R20 0] Left sided numbness     3/19/2018  3:12 AM Geneva Favorite Add [G45 9] TIA (transient ischemic attack)     3/19/2018  3:12 AM Geneva Favorite Add [R51] Headache     3/19/2018  3:05 PM Margarito Sweeney I Add [I10] Essential hypertension     3/19/2018  3:05 PM Pedro Pablo Shahid I Modify [I10] Essential hypertension     3/19/2018  3:05 PM Angélica Sweeney U  23  Essential hypertension     3/19/2018  6:17 PM Yasmani Sweeney [I16 1] Hypertensive emergency       ED Disposition     ED Disposition Condition Comment    Admit  Case was discussed with CATRINA and the patient's admission status was agreed to be Admission Status: observation status to the service of Dr Homa Key   Follow-up Information    None       There are no discharge medications for this patient  No discharge procedures on file      ED Provider  Electronically Signed by           Donald Ruiz MD  03/21/18 7907

## 2018-03-19 NOTE — H&P
VTE Prophylaxis: Heparin  / sequential compression device   Code Status: Full   POLST: There is no POLST form on file for this patient (pre-hospital)  Discussion with family: Family is present for the entire evaluation  Anticipated Length of Stay:  Patient will be admitted on an Observation basis with an anticipated length of stay of  Less than 2 midnights  Justification for Hospital Stay:  Neurology evaluation    Total Time for Visit, including Counseling / Coordination of Care: 30 minutes  Greater than 50% of this total time spent on direct patient counseling and coordination of care  Chief Complaint:   Numbness    History of Present Illness:    Osvaldo Hsieh is a 74 y o  male who presents with complaints of headache and numbness  Patient states last 2 weeks he has had a headache  Patient states around 8:00 p m  tonight he started to experience left hand and left mouth numbness  Patient states that the symptoms have since resolved  Patient denies chest pain, shortness of breath, nausea, vomiting, visual changes  Patient states he was dizzy and confused for a period of time and has slurred speech but that this has all resolved  Patient denies previous history of similar symptoms  Review of Systems:    Review of Systems   Neurological: Positive for headaches  All other systems reviewed and are negative  Past Medical and Surgical History:     Past Medical History:   Diagnosis Date    Hyperlipidemia        History reviewed  No pertinent surgical history  Meds/Allergies:    Prior to Admission medications    Not on File     I have reviewed home medications with patient personally  Allergies:    Allergies   Allergen Reactions    Valium [Diazepam] Dizziness       Social History:     Marital Status: /Civil Union   Occupation:  Retired  Patient Pre-hospital Living Situation:  Lives at home  Patient Pre-hospital Level of Mobility:  Ambulatory  Patient Pre-hospital Diet Restrictions: None  Substance Use History:   History   Alcohol Use No     History   Smoking Status    Never Smoker   Smokeless Tobacco    Never Used     History   Drug Use No       Family History:    History reviewed  No pertinent family history  Physical Exam:     Vitals:   Blood Pressure: (!) 198/91 (03/19/18 0230)  Pulse: 64 (03/19/18 0230)  Temperature: 98 °F (36 7 °C) (03/19/18 0037)  Temp Source: Oral (03/19/18 0037)  Respirations: 20 (03/19/18 0230)  Height: 5' 8" (172 7 cm) (03/19/18 0037)  Weight - Scale: (!) 146 kg (321 lb 14 oz) (03/19/18 0037)  SpO2: 97 % (03/19/18 0230)    Physical Exam   Constitutional: He is oriented to person, place, and time  He appears well-developed and well-nourished  HENT:   Head: Normocephalic and atraumatic  Right Ear: External ear normal    Left Ear: External ear normal    Nose: Nose normal    Mouth/Throat: Oropharynx is clear and moist    Eyes: Conjunctivae and EOM are normal  Pupils are equal, round, and reactive to light  Neck: Normal range of motion  Cardiovascular: Normal rate, regular rhythm and normal heart sounds  Pulmonary/Chest: Effort normal and breath sounds normal    Abdominal: Soft  Bowel sounds are normal    Musculoskeletal: Normal range of motion  He exhibits no edema  Neurological: He is alert and oriented to person, place, and time  No cranial nerve deficit  Coordination normal    Skin: Skin is warm and dry  Psychiatric: He has a normal mood and affect  His behavior is normal  Judgment and thought content normal    Vitals reviewed  Additional Data:     Lab Results: I have personally reviewed pertinent reports          Results from last 7 days  Lab Units 03/19/18  0104   WBC Thousand/uL 9 85   HEMOGLOBIN g/dL 17 3*   HEMATOCRIT % 52 2*   PLATELETS Thousands/uL 250       Results from last 7 days  Lab Units 03/19/18  0104   SODIUM mmol/L 138   POTASSIUM mmol/L 4 1   CHLORIDE mmol/L 98*   CO2 mmol/L 30   BUN mg/dL 23   CREATININE mg/dL 1 41* CALCIUM mg/dL 10 1   TOTAL PROTEIN g/dL 8 4*   BILIRUBIN TOTAL mg/dL 0 80   ALK PHOS U/L 67   ALT U/L 35   AST U/L 24   GLUCOSE RANDOM mg/dL 136       Results from last 7 days  Lab Units 03/19/18  0104   INR  0 92       Imaging: I have personally reviewed pertinent reports  CTA head and neck with and without contrast   ED Interpretation by Odell Moy MD (03/19 0308)   IMPRESSION:   No acute findings  IMPRESSION:   No acute findings  X-ray chest 2 views    (Results Pending)       EKG, Pathology, and Other Studies Reviewed on Admission:   · EKG:     Allscripts / Epic Records Reviewed: Yes     ** Please Note: This note has been constructed using a voice recognition system   **

## 2018-03-19 NOTE — PLAN OF CARE
Problem: PHYSICAL THERAPY ADULT  Goal: Performs mobility at highest level of function for planned discharge setting  See evaluation for individualized goals  Treatment/Interventions: Functional transfer training, LE strengthening/ROM, Elevations, Therapeutic exercise, Endurance training, Patient/family training, Equipment eval/education, Bed mobility, Gait training, Spoke to nursing, OT  Equipment Recommended:  (TBD, anticipate none)       See flowsheet documentation for full assessment, interventions and recommendations  Prognosis: Good  Problem List: Decreased endurance, Impaired balance, Decreased mobility, Decreased safety awareness, Pain  Assessment: Pt is 74 y o  male seen for PT evaluation on 3/19/2018 s/p admit to [facility] on 3/19/2018 w/ TIA (transient ischemic attack)  Per provider note, chart review, "presented to the ER with chief complaints of headache and numbness, patient has had history of headaches when he was a teenager and started having headaches about 2 weeks back mostly on the right side on and off associated with photophobia and phonophobia, he also had some numbness of the left hand and the left mouth and by the time he came to the ER his symptoms had resolved, according to the admitting physician's note patient was slightly dizzy and confused for a period of time and has slurred speech but that all his resolved, according to the patient it lasted for a few minutes, currently he is asymptomatic"  PT consulted to assess pt's functional mobility and d/c needs  Order placed for PT eval and tx, w/ up w/ A order  Performed at least 2 patient identifiers during session: Name and wristband  Comorbidities affecting pt's physical performance at time of assessment include: HLD, HTN, obesity  PTA, pt was independent w/ all functional mobility w/ no AD/DME, ambulates unrestricted distances and all terrain, has 0 PRABHU, lives w/ wife in 1 level home and retired   Personal factors affecting pt at time of IE include: inability to ambulate household distances, inability to navigate level surfaces w/o external assistance, decreased initiation and engagement and limited insight into impairments  Please find objective findings from PT assessment regarding body systems outlined above with impairments and limitations including impaired balance, decreased endurance, gait deviations and decreased safety awareness, as well as mobility assessment (need for SBA assist w/ all phases of mobility when usually ambulating independently and need for cueing for mobility technique)  The following objective measures performed on IE also reveal limitations: Barthel Index: 60/100 and Modified [place]: 3 (moderate disability)  Pt's clinical presentation is currently unstable/unpredictable seen in pt's presentation of elevated BP, need for input for task focus and mobility technique, on telemetry monitoring, unstable medical status and pending further medical management/neurology/MRI  Pt to benefit from continued PT tx to address deficits as defined above and maximize level of functional independent mobility and consistency  From PT/mobility standpoint, recommendation at time of d/c would be TBD pending further asses  Barriers to Discharge: None     Recommendation: Other (Comment) (TBD pending further assessment)     PT - OK to Discharge: No    See flowsheet documentation for full assessment

## 2018-03-19 NOTE — PLAN OF CARE
Problem: OCCUPATIONAL THERAPY ADULT  Goal: Performs self-care activities at highest level of function for planned discharge setting  See evaluation for individualized goals  Treatment Interventions: ADL retraining, Functional transfer training, UE strengthening/ROM, Endurance training, Energy conservation, Compensatory technique education, Neuromuscular reeducation, Patient/family training          See flowsheet documentation for full assessment, interventions and recommendations  Limitation: Decreased high-level ADLs, Decreased self-care trans  Prognosis: Good  Assessment: Pt is a 74 y o  male seen for OT evaluation s/p admit to [facility] on 3/19/2018 w/ TIA (transient ischemic attack)  Comorbidities affecting pt's functional performance at time of assessment include: VASQUEZ, HTN, elevated BP  Personal factors affecting pt at time of IE include:limited home support, difficulty performing ADLS and difficulty performing IADLS   Prior to admission, pt was living home with spouse in a 1 level home, no david to enter  He reports being I/BADLs, IADLs and functional mobility  (+) drives  Upon evaluation: Pt requires supervision/SBA for bathing dressing, toileting and functional mobility w/o device or DME  He is A&Ox4  Pt c/o headache pain of 8/10, RN informed  According to the Barthel Index, pt scoring a 60/100 indicating the following deficits impacting occupational performance: weakness, decreased strength, decreased balance and decreased tolerance  Pt to benefit from continued skilled OT tx while in the hospital to address deficits as defined above and maximize level of functional independence w ADL's and functional mobility   Occupational Performance areas to address include: grooming, bathing/shower, toilet hygiene, dressing, medication management, socialization, health maintenance, functional mobility, community mobility, clothing management, cleaning, meal prep, money management, household maintenance and social participation  From OT standpoint, recommendation at time of d/c would be home Independent with family support as needed       OT Discharge Recommendation: Home with family support  OT - OK to Discharge: Yes (when medically stable )

## 2018-03-19 NOTE — PROCEDURES
Arterial Line Insertion  Date/Time: 3/19/2018 6:15 PM  Performed by: Tamie Coles by: Jose Manuel Single     Patient location:  Bedside  Other Assisting Provider: No    Consent:     Consent obtained:  Written    Consent given by:  Patient    Risks discussed:  Bleeding, ischemia, pain, infection and repeat procedure  Universal protocol:     Procedure explained and questions answered to patient or proxy's satisfaction: yes      Relevant documents present and verified: yes      Required blood products, implants, devices, and special equipment available: yes      Site/side marked: yes      Immediately prior to procedure a time out was called: yes      Patient identity confirmed:  Verbally with patient  Indications:     Indications: hemodynamic monitoring and continuous blood pressure monitoring    Pre-procedure details:     Skin preparation:  Chlorhexidine    Preparation: Patient was prepped and draped in sterile fashion    Anesthesia (see MAR for exact dosages): Anesthesia method:  Local infiltration    Local anesthetic:  Lidocaine 1% w/o epi  Procedure details:     Location / Tip of Catheter:  Radial    Laterality:  Right    Elio's test performed: yes      Elio's test abnormal: no      Needle gauge:  20 G    Placement technique:  Percutaneous    Number of attempts:  1    Successful placement: yes      Transducer: waveform confirmed    Post-procedure details:     Post-procedure:  Secured with tape, sterile dressing applied and sutured    CMS:  Normal    Patient tolerance of procedure:   Tolerated well, no immediate complications

## 2018-03-19 NOTE — MALNUTRITION/BMI
This medical record reflects one or more clinical indicators suggestive of malnutrition and/or morbid obesity  Malnutrition Findings:              BMI Findings:  BMI Classifications: Morbid Obesity 45-49 9 (related to energy intake exceeding energy expenditure over time as evidenced by BMI)     Body mass index is 48 94 kg/m²  See Nutrition note dated 3/19/18 for additional details  Completed nutrition assessment is viewable in the nutrition documentation

## 2018-03-19 NOTE — OCCUPATIONAL THERAPY NOTE
Occupational Therapy Evaluaiton         Patient Name: Hillary Antunez  MNIUJ'[id number] Date: 3/19/2018     03/19/18 0901   Note Type   Note type Eval/Treat   Restrictions/Precautions   Weight Bearing Precautions Per Order No   Braces or Orthoses (none per pt)   Other Precautions Multiple lines;Telemetry; Fall Risk;Pain   Pain Assessment   Pain Assessment 0-10   Pain Score 8   Pain Type Acute pain   Pain Location Head   Pain Orientation Right;Frontal   Home Living   Type of [address] One level;Performs ADLs on one level; Able to live on main level with bedroom/bathroom   Bathroom Shower/Tub Tub/shower unit   Bathroom Toilet Standard   Bathroom Accessibility Accessible   Home Equipment (no DME at baseline)   Prior Function   Level of [place] Independent with ADLs and functional mobility   Lives With Spouse   Receives Help From Family   ADL Assistance Independent   IADLs Independent   Falls in the last 6 months 0   Vocational Retired   Comments (+) driving   Lifestyle   Autonomy Pt I/BADLs and IADLs   Reciprocal Relationships supportive wife    Psychosocial   Psychosocial (WDL) WDL   ADL   Eating Assistance 7  Independent   Grooming Assistance 7  Independent   UB Bathing Assistance 5  Supervision/Setup   LB Bathing Assistance 5  Supervision/Setup   UB Dressing Assistance 5  Supervision/Setup   LB Dressing Assistance 5  [phone]  Supervision/Setup   Bed Mobility   Additional Comments vitals pre mobility: 58bpm, 95% SPO2 on RA, 182/77mmHg  (vital post activity )   Transfers   Sit to Stand 5  Supervision   Stand to Sit 5  Supervision   Toilet transfer 5  Supervision   Functional Mobility   Functional Mobility 5  Supervision   Additional items (none )   Balance   Static Sitting Normal   Dynamic Sitting Normal   Static Standing Fair +   Dynamic Standing Fair   Activity Tolerance   Activity Tolerance Patient limited by fatigue;Patient limited by pain   RUE Assessment   RUE Assessment WFL   LUE Assessment   LUE Assessment WFL   Hand Function   Gross Motor Coordination Functional   Fine Motor Coordination Functional   Sensation   Light Touch No apparent deficits   Vision-Basic Assessment   Current Vision Wears glasses all the time   Vision - Complex Assessment   Ocular Range of Motion [facility]   Perception   Inattention/Neglect Appears intact   Cognition   Overall Cognitive Status WFL   Orientation Level Oriented X4   Memory Within functional limits   Following Commands Follows all commands and directions without difficulty   Assessment   Limitation Decreased high-level ADLs; Decreased self-care trans   Prognosis Good   Assessment Pt is a 74 y o  male seen for OT evaluation s/p admit to [facility] on 3/19/2018 w/ TIA (transient ischemic attack)  Comorbidities affecting pt's functional performance at time of assessment include: VASQUEZ, HTN, elevated BP  Personal factors affecting pt at time of IE include:limited home support, difficulty performing ADLS and difficulty performing IADLS   Prior to admission, pt was living home with spouse in a 1 level home, no david to enter  He reports being I/BADLs, IADLs and functional mobility  (+) drives  Upon evaluation: Pt requires supervision/SBA for bathing dressing, toileting and functional mobility w/o device or DME  He is A&Ox4  Pt c/o headache pain of 8/10, RN informed  According to the Barthel Index, pt scoring a 60/100 indicating the following deficits impacting occupational performance: weakness, decreased strength, decreased balance and decreased tolerance  Pt to benefit from continued skilled OT tx while in the hospital to address deficits as defined above and maximize level of functional independence w ADL's and functional mobility   Occupational Performance areas to address include: grooming, bathing/shower, toilet hygiene, dressing, medication management, socialization, health maintenance, functional mobility, community mobility, clothing management, cleaning, meal prep, money management, household maintenance and social participation  From OT standpoint, recommendation at time of d/c would be home Independent with family support as needed  Goals   Patient Goals "to return home"   Functional Transfer Goals   Pt Will Perform All Functional Transfers Independently   ADL Goals   Pt Will Perform All ADL's Independently   Pt Will Perform Eating Independently   Pt Will Perform Grooming Independently   Pt Will Perform Bathing Independently   Pt Will Perform UE Dressing Independently   Pt Will Perform LE Dressing Independently   Pt Will Perform Toileting Independently   Plan   Treatment Interventions ADL retraining;Functional transfer training;UE strengthening/ROM; Endurance training;Energy conservation; Compensatory technique education; Neuromuscular reeducation;Patient/family training   Goal Expiration Date 03/29/18   OT Frequency 5x/wk   Recommendation   OT Discharge Recommendation Home with family support   OT - OK to Discharge Yes  (when medically stable )   Barthel Index   Feeding 10   Bathing 0   Grooming Score 5   Dressing Score 10   Bladder Score 10   Bowels Score 10   Toilet Use Score 5   Transfers (Bed/Chair) Score 10   Mobility (Level Surface) Score 0   Stairs Score 0   Barthel Index Score 60   Modified [place] Scale   Modified [place] Scale 3

## 2018-03-19 NOTE — PROGRESS NOTES
Critical Care Interval Progress Note     Brenna Amin 74 y o  male MRN: [id number]    Unit/Bed#: ED 26 Encounter: [id number]    Impression:  Principal Problem:    TIA (transient ischemic attack)  Active Problems:    VASQUEZ (acute kidney injury) ([facility] [address] )    Essential hypertension  Resolved Problems:    * No resolved hospital problems  *    Hypertensive emergency    Plan:  · Will transfer to ICU for hypertensive emergency  · Will start nitro drip for BP control, place Farnaz  · VASQUEZ-repeat BMP and follow renal indices-creatinine had improved on second BMP  · Neurology following-continue ASA  · Consult  Cardiology for EKG changes  · Trend troponins  · Serial neuro exams      Counseling / Coordination of Care: Total Critical Care time spent 32 minutes excluding procedures, teaching and family updates  ______________________________________________________________________    Chief Complaint: headache    Recent Events / Nursing Concern: The patient is a 74year old make who presented to the ED overnight with complaints of headache and numbness  He was found to have a SBP in the ED of 270  He was given a dose of labetolol and his SBP decreased to 190s  This afternoon his blood pressure again was in the 220s and he was given hydralazine  After the hydralazine he had chest pain and was noted to be bradycardiac and have EKG changes with T wave inversion and ST depression  For this reason he's being transferred to the ICU for further monitoring and treatment       Vitals:   Vitals:    18 1100 18 1323 18 1346 18 1414   BP: (!) 180/79 (!) 220/100 147/76 (!) 187/94   BP Location:  Right arm     Pulse: 56 65 68 74   Resp: 16 18 20 (!) 26   Temp:       TempSrc:       SpO2: 95% 97% 96% 98%   Weight:       Height:                 Temperature: Temp (24hrs), Av °F (36 7 °C), Min:98 °F (36 7 °C), Max:98 °F (36 7 °C)  Current: Temperature: 98 °F (36 7 °C)    Hemodynamic Monitoring:  N/A Respiratory:  SpO2: SpO2: 98 %       Physical Exam:  Physical Exam   Constitutional: He is oriented to person, place, and time  He appears well-developed and well-nourished  No distress  morbidly obese   HENT:   Head: Normocephalic and atraumatic  Eyes: Conjunctivae are normal  Pupils are equal, round, and reactive to light  Neck: Normal range of motion  Neck supple  No JVD present  No thyromegaly present  Cardiovascular: Normal rate, regular rhythm and normal heart sounds  Exam reveals no gallop and no friction rub  No murmur heard  Pulmonary/Chest: Effort normal and breath sounds normal  No respiratory distress  Abdominal: Soft  Bowel sounds are normal  He exhibits no distension  There is no tenderness  Genitourinary:   Genitourinary Comments: Deferred   Musculoskeletal: He exhibits no edema  Lymphadenopathy:     He has no cervical adenopathy  Neurological: He is alert and oriented to person, place, and time  No cranial nerve deficit  Skin: Skin is warm and dry  No rash noted  No erythema  Psychiatric: He has a normal mood and affect  Allergies:    Allergies   Allergen Reactions    Valium [Diazepam] Dizziness       Medications:   Scheduled Meds:  Current Facility-Administered Medications:  acetaminophen 650 mg Oral Q4H PRN Nasim Sahni PA-C    aspirin 325 mg Oral Daily Nasim Sahni PA-C    atorvastatin 40 mg Oral QPM Nasim Sahni PA-C    heparin (porcine) 5,000 Units Subcutaneous [facility] Nasmi Sahni PA-C    metoprolol tartrate 25 mg Oral Q12H [facility] Nasim Sahni PA-C    ondansetron 4 mg Intravenous Q6H PRN Nasim Sahni PA-C    sodium chloride 75 mL/hr Intravenous Continuous Nasim Sahni PA-C Last Rate: 75 mL/hr (03/19/18 0629)     Continuous Infusions:  sodium chloride 75 mL/hr Last Rate: 75 mL/hr (03/19/18 0629)     PRN Meds:    acetaminophen 650 mg Q4H PRN   ondansetron 4 mg Q6H PRN       Labs:     Results from last 7 days  Lab Units 03/19/18 0627 03/19/18  0104   WBC Thousand/uL 8 06 9 85   HEMOGLOBIN g/dL 16 2 17 3*   HEMATOCRIT % 47 7 52 2*   PLATELETS Thousands/uL 220  220 250       Results from last 7 days  Lab Units 03/19/18 0627 03/19/18  0104   SODIUM mmol/L 138 138   POTASSIUM mmol/L 4 3 4 1   CHLORIDE mmol/L 100 98*   CO2 mmol/L 29 30   BUN mg/dL 21 23   CREATININE mg/dL 1 24 1 41*   CALCIUM mg/dL 9 3 10 1   TOTAL PROTEIN g/dL  --  8 4*   BILIRUBIN TOTAL mg/dL  --  0 80   ALK PHOS U/L  --  67   ALT U/L  --  35   AST U/L  --  24   GLUCOSE RANDOM mg/dL 135 136                Results from last 7 days  Lab Units 03/19/18  0104   INR  0 92   PTT seconds 27           0  Lab Value Date/Time   TROPONINI <0 02 03/19/2018 0104           Diagnostic Imaging / Data:  X-ray chest 2 views   Final Result      No acute cardiopulmonary disease  Workstation performed: [id number]         CTA head and neck with and without contrast   ED Interpretation   IMPRESSION:   No acute findings  IMPRESSION:   No acute findings  Final Result      No acute intracranial abnormality  No focal stenosis or saccular aneurysm within the [place]  No hemodynamically significant stenosis within either common or internal carotid artery  Less than 50% stenosis by NASCET criteria  22 mm left thyroid lobe nodule  A nonemergent thyroid ultrasound is recommended for further characterization  No prior studies are available for comparison  Findings are consistent with the preliminary report from [facility] which was provided shortly after completion of the exam  The additional finding of the left thyroid lobe nodule will be communicated by the radiology  liaison  Workstation performed: [id number]         MRI brain wo contrast    (Results Pending)      I have personally reviewed pertinent reports     and I have personally reviewed pertinent films in PACS  EKG: NSR  Code Status: Level 1 - Full Code    Portions of the record may have been created with voice recognition software  Occasional wrong word or "sound a like" substitutions may have occurred due to the inherent limitations of voice recognition software  Read the chart carefully and recognize, using context, where substitutions have occurred      SIGNATURE: TAVARES Chua  DATE: March 19, 2018  TIME: 3:23 PM

## 2018-03-19 NOTE — ED PROVIDER NOTES
History  Chief Complaint   Patient presents with    Numbness     pt co of L face and hand numbness onset tonight about 9pm  "i have had a headache for about 2 weeks now"  HPI    None       Past Medical History:   Diagnosis Date    Hyperlipidemia        History reviewed  No pertinent surgical history  History reviewed  No pertinent family history  I have reviewed and agree with the history as documented      Social History   Substance Use Topics    Smoking status: Never Smoker    Smokeless tobacco: Never Used    Alcohol use No        Review of Systems    Physical Exam  ED Triage Vitals   Temperature Pulse Respirations Blood Pressure SpO2   03/19/18 0037 03/19/18 0037 03/19/18 0037 03/19/18 0038 03/19/18 0038   98 °F (36 7 °C) 70 20 (!) 270/134 95 %      Temp Source Heart Rate Source Patient Position - Orthostatic VS BP Location FiO2 (%)   03/19/18 0037 03/19/18 0037 03/19/18 0037 03/19/18 0037 --   Oral Monitor Lying Right arm       Pain Score       03/19/18 0037       No Pain           Orthostatic Vital Signs  Vitals:    03/19/18 1100 03/19/18 1323 03/19/18 1346 03/19/18 1414   BP: (!) 180/79 (!) 220/100 147/76 (!) 187/94   Pulse: 56 65 68 74   Patient Position - Orthostatic VS:           Physical Exam    ED Medications  Medications   atorvastatin (LIPITOR) tablet 40 mg (not administered)   ondansetron (ZOFRAN) injection 4 mg (not administered)   acetaminophen (TYLENOL) tablet 650 mg (650 mg Oral Given 3/19/18 1314)   aspirin tablet 325 mg (325 mg Oral Not Given 3/19/18 0824)   heparin (porcine) subcutaneous injection 5,000 Units (5,000 Units Subcutaneous Not Given 3/19/18 0631)   metoprolol tartrate (LOPRESSOR) tablet 25 mg (25 mg Oral Given 3/19/18 0828)   sodium chloride 0 9 % infusion (75 mL/hr Intravenous New Bag 3/19/18 0629)   labetalol (NORMODYNE) injection 10 mg (10 mg Intravenous Given 3/19/18 0106)   metoclopramide (REGLAN) injection 10 mg (10 mg Intravenous Given 3/19/18 0112)   iodixanol (VISIPAQUE) 320 MG/ML injection 100 mL (100 mL Intravenous Given 3/19/18 0153)   HYDROmorphone (DILAUDID) injection 0 5 mg (0 5 mg Intravenous Given 3/19/18 0327)   aspirin tablet 325 mg (325 mg Oral Given 3/19/18 0326)       Diagnostic Studies  Results Reviewed     Procedure Component Value Units Date/Time    Hemoglobin A1c w/EAG Estimation [[id number]]  (Normal) Collected:  03/19/18 0627    Lab Status:  Final result Specimen:  Blood from Arm, Left Updated:  03/19/18 1245     Hemoglobin A1C 6 0 %       mg/dl     Sedimentation rate, automated [[id number]]  (Normal) Collected:  03/19/18 1034    Lab Status:  Final result Specimen:  Blood from Arm, Left Updated:  03/19/18 1138     Sed Rate 2 mm/hour     Lipid Panel with Direct LDL reflex [[id number]]  (Abnormal) Collected:  03/19/18 0627    Lab Status:  Final result Specimen:  Blood from Arm, Left Updated:  03/19/18 0657     Cholesterol 234 (H) mg/dL      Triglycerides 74 mg/dL      HDL, Direct 43 mg/dL      LDL Calculated 176 (H) mg/dL     Narrative:         Triglyceride:        Normal               <150 mg/dl        Borderline High     150-199 mg/dl        High               200-499 mg/dl        Very High           >499 mg/dl      Basic metabolic panel [[id number]] Collected:  03/19/18 1973    Lab Status:  Final result Specimen:  Blood from Arm, Left Updated:  03/19/18 5163     Sodium 138 mmol/L      Potassium 4 3 mmol/L      Chloride 100 mmol/L      CO2 29 mmol/L      Anion Gap 9 mmol/L      BUN 21 mg/dL      Creatinine 1 24 mg/dL      Glucose 135 mg/dL      Calcium 9 3 mg/dL      eGFR 61 ml/min/1 73sq m     Narrative:         National Kidney Disease Education Program recommendations are as follows:  GFR calculation is accurate only with a steady state creatinine  Chronic Kidney disease less than 60 ml/min/1 73 sq  meters  Kidney failure less than 15 ml/min/1 73 sq  meters      CBC (With Platelets) [[id number]]  (Normal) Collected:  03/19/18 0627    Lab Status:  Final result Specimen:  Blood from Arm, Left Updated:  03/19/18 0637     WBC 8 06 Thousand/uL      RBC 5 02 Million/uL      Hemoglobin 16 2 g/dL      Hematocrit 47 7 %      MCV 95 fL      MCH 32 3 pg      MCHC 34 0 g/dL      RDW 13 5 %      Platelets 593 Thousands/uL      MPV 10 3 fL     Platelet count [[id number]]  (Normal) Collected:  03/19/18 9562    Lab Status:  Final result Specimen:  Blood from Arm, Left Updated:  03/19/18 6749     Platelets 644 Thousands/uL      MPV 10 3 fL     Troponin I [[id number]]  (Normal) Collected:  03/19/18 0104    Lab Status:  Final result Specimen:  Blood from Arm, Left Updated:  03/19/18 0158     Troponin I <0 02 ng/mL     Narrative:         [employer] Chemistry analyzer 99% cutoff is > 0 04 ng/mL in network labs    o cTnI 99% cutoff is useful only when applied to patients in the clinical setting of myocardial ischemia  o cTnI 99% cutoff should be interpreted in the context of clinical history, ECG findings and possibly cardiac imaging to establish correct diagnosis  o cTnI 99% cutoff may be suggestive but clearly not indicative of a coronary event without the clinical setting of myocardial ischemia      Comprehensive metabolic panel [[id number]]  (Abnormal) Collected:  03/19/18 0104    Lab Status:  Final result Specimen:  Blood from Arm, Left Updated:  03/19/18 0154     Sodium 138 mmol/L      Potassium 4 1 mmol/L      Chloride 98 (L) mmol/L      CO2 30 mmol/L      Anion Gap 10 mmol/L      BUN 23 mg/dL      Creatinine 1 41 (H) mg/dL      Glucose 136 mg/dL      Calcium 10 1 mg/dL      AST 24 U/L      ALT 35 U/L      Alkaline Phosphatase 67 U/L      Total Protein 8 4 (H) g/dL      Albumin 4 4 g/dL      Total Bilirubin 0 80 mg/dL      eGFR 52 ml/min/1 73sq m     Narrative:         National Kidney Disease Education Program recommendations are as follows:  GFR calculation is accurate only with a steady state creatinine  Chronic Kidney disease less than 60 ml/min/1 73 sq  meters  Kidney failure less than 15 ml/min/1 73 sq  meters  APTT [[id number]]  (Normal) Collected:  03/19/18 0104    Lab Status:  Final result Specimen:  Blood from Arm, Left Updated:  03/19/18 0136     PTT 27 seconds     Narrative: Therapeutic Heparin Range = 60-90 seconds    Protime-INR [[id number]]  (Normal) Collected:  03/19/18 0104    Lab Status:  Final result Specimen:  Blood from Arm, Left Updated:  03/19/18 0136     Protime 12 6 seconds      INR 0 92    CBC [[id number]]  (Abnormal) Collected:  03/19/18 0104    Lab Status:  Final result Specimen:  Blood from Arm, Left Updated:  03/19/18 0124     WBC 9 85 Thousand/uL      RBC 5 42 Million/uL      Hemoglobin 17 3 (H) g/dL      Hematocrit 52 2 (H) %      MCV 96 fL      MCH 31 9 pg      MCHC 33 1 g/dL      RDW 13 4 %      Platelets 673 Thousands/uL      MPV 10 2 fL                  X-ray chest 2 views   Final Result by Ruthy Haider MD (03/19 1002)      No acute cardiopulmonary disease  Workstation performed: [id number]         CTA head and neck with and without contrast   ED Interpretation by Talia Peralta MD (03/19 0303)   IMPRESSION:   No acute findings  IMPRESSION:   No acute findings  Final Result by Veronika Guillory MD (03/19 0818)      No acute intracranial abnormality  No focal stenosis or saccular aneurysm within the [place] of [place]  No hemodynamically significant stenosis within either common or internal carotid artery  Less than 50% stenosis by NASCET criteria  22 mm left thyroid lobe nodule  A nonemergent thyroid ultrasound is recommended for further characterization  No prior studies are available for comparison  Findings are consistent with the preliminary report from [facility] which was provided shortly after completion of the exam  The additional finding of the left thyroid lobe nodule will be communicated by the radiology  liaison        Workstation performed: [id number]         MRI brain wo contrast (Results Pending)              Procedures  Procedures       Phone Contacts  ED Phone Contact    ED Course  ED Course as of Mar 19 1523   Mon Mar 19, 2018   1433 Patient was given10 mg of hydralazine for his pressure over 200 on the emergency room  After receiving the medication he had a sudden onset of left-sided chest pain with pain radiating into his left arm  Complained of a 10 minutes episode of chest pain as well  No nausea no diaphoresis  He did complain of palpitations  His pressure decreased down to 146/70  The EKG demonstrated biphasic T-waves in the inferior lateral leads  The patient is admitted to Dr Olive Carroll service  I did attempt to page her through the  with no response  I did ask the nurse to call her to notify her of the event as the patient is admitted  The patient is completely pain free at this time and his EKG is back to baseline  [address] spoke to Dr Olive Carroll  She is at the bedside evaluating the patient                NIH Stroke Scale    Flowsheet Row Most Recent Value   Level of Consciousness (1a )  0 Filed at: 03/19/2018 0400   LOC Questions (1b )  0 Filed at: 03/19/2018 0400   LOC Commands (1c )  0 Filed at: 03/19/2018 0400   Best Gaze (2 )  0 Filed at: 03/19/2018 0400   Visual (3 )  0 Filed at: 03/19/2018 0400   Facial Palsy (4 )  0 Filed at: 03/19/2018 0400   Motor Arm, Left (5a )  0 Filed at: 03/19/2018 0400   Motor Arm, Right (5b )  0 Filed at: 03/19/2018 0400   Motor Leg, Left (6a )  0 Filed at: 03/19/2018 0400   Motor Leg, Right (6b )  0 Filed at: 03/19/2018 0400   Limb Ataxia (7 )  0 Filed at: 03/19/2018 0400   Sensory (8 )  0 Filed at: 03/19/2018 0400   Best Language (9 )  0 Filed at: 03/19/2018 0400   Dysarthria (10 )  0 Filed at: 03/19/2018 0400   Extinction and Inattention (11 ) (Formerly Neglect)  0 Filed at: 03/19/2018 0400   Total  0 Filed at: 03/19/2018 0400                        [facility]  CritCare Time    Disposition  Final diagnoses:   Left sided numbness TIA (transient ischemic attack)   Headache     Time reflects when diagnosis was documented in both MDM as applicable and the Disposition within this note     Time User Action Codes Description Comment    3/19/2018  3:12 AM Oneda Hoop Add [R20 0] Left sided numbness     3/19/2018  3:12 AM Oneda Hoop Add [G45 9] TIA (transient ischemic attack)     3/19/2018  3:12 AM Oneda Hoop Add [R51] Headache     3/19/2018  3:05 PM Eugene S[address]  Essential hypertension     3/19/2018  3:05 PM Angélica Sweeney [address]  Essential hypertension     3/19/2018  3:05 PM Angélica Sweeney [address]  Essential hypertension       ED Disposition     ED Disposition Condition Comment    Admit  Case was discussed with CAT[facility] and the patient's admission status was agreed to be Admission Status: observation status to the service of Dr John Petersen   Follow-up Information    None       Patient's Medications    No medications on file     No discharge procedures on file      ED Provider  Electronically Signed by           Adalberto Kincaid PA-C  03/19/18 8533

## 2018-03-19 NOTE — CONSULTS
PHYSICAL MEDICINE AND REHABILITATION CONSULT NOTE  Mannie Mckinnon 74 y o  male MRN: [id number]  Unit/Bed#: ED 32 Encounter: [id number]    Requested by (Physician/Service): Maryjo Mabry MD  Reason for Consultation:  Assessment of rehabilitation needs  Chief Complaint:  Headache and numbness    History of Present Illness:  Mannie Mckinnon is a 74 y o  male who  has a past medical history of Hyperlipidemia  and presented to the [address] [place] [address] Road  with complaints of headache and numbness  Patient has history of headaches since he was a teenager  and started having headaches about 2 weeks back mostly on the right side on and off associated with photophobia he also noticed numbness of the left hand and the left mouth  By the time ER right in the ER his symptoms had resolved  Patient also had episode of dizziness, confusion in a period of slurred speech but symptoms have resolved  CT head and CTA of the head and neck was unremarkable except for a thyroid nodule  Neurology was consulted  MRI of the brain is pending  Restrictions include:  none    Hospital Course/Comorbidities:   Comorbidities:   As above      Last Weight:    Wt Readings from Last 1 Encounters:   03/19/18 (!) 146 kg (321 lb 14 oz)     Last BMI:  Body mass index is 48 94 kg/m²  Functional History:     Prior to Admission:     Functional Status: Patient was independent with mobility/ambulation, transfers, ADL's, IADL's  Present:  Physical Therapy:  Sit to stand: Supervision, ambulated 10 feet with supervision, assist of 1 and verbal cues       Occupational Therapy:    Supervision with upper body bathing, lower body bathing, upper body dressing, lower body dressing, toileting, independent with grooming and eating  Speech Therapy:  No apparent deficits in speechor language               Past Medical History:    Past Medical History:   Diagnosis Date    Hyperlipidemia         Past Surgical History:    History reviewed  No pertinent surgical history  Medications:    Current Facility-Administered Medications:     acetaminophen (TYLENOL) tablet 650 mg, 650 mg, Oral, Q4H PRN, Zayda Ralph PA-C, 650 mg at 18 1314    aspirin tablet 325 mg, 325 mg, Oral, Daily, Zayda Ralph PA-C    atorvastatin (LIPITOR) tablet 40 mg, 40 mg, Oral, QPM, Zayda Ralph PA-C    azilsartan medoxomil (EDARBI) tablet 80 mg, 80 mg, Oral, Daily, Zayda Ralph PA-C, 80 mg at 18 1155    heparin (porcine) subcutaneous injection 5,000 Units, 5,000 Units, Subcutaneous, Q8H [address] **AND** Platelet count, , , Once, Zayda Ralph PA-C    hydrALAZINE (APRESOLINE) injection 10 mg, 10 mg, Intravenous, Q4H PRN, Gt Donato MD    metoprolol tartrate (LOPRESSOR) tablet 25 mg, 25 mg, Oral, Q12H [address], Zayda Ralph PA-C, 25 mg at 18 0828    ondansetron (ZOFRAN) injection 4 mg, 4 mg, Intravenous, Q6H PRN, Zayda Ralph PA-C    sodium chloride 0 9 % infusion, 75 mL/hr, Intravenous, Continuous, Zayda Ralph PA-C, Last Rate: 75 mL/hr at 18 0629, 75 mL/hr at 18 0629  No current outpatient prescriptions on file  Allergies: Allergies   Allergen Reactions    Valium [Diazepam] Dizziness        Family History:    History reviewed  No pertinent family history  Social History:    Social History     Social History    Marital status: /Civil Union     Spouse name: N/A    Number of children: N/A    Years of education: N/A     Social History Main Topics    Smoking status: Never Smoker    Smokeless tobacco: Never Used    Alcohol use No    Drug use: No    Sexual activity: Not Asked     Other Topics Concern    None     Social History Narrative    None        Susan Feng   Lives in a 1 level house with 0 steps to enter, performs ADL's on  One  level  Review of systems:    Neurologic: positive for headache, positive for left hand numbness      All other review of systems were negative  Physical Exam:  BP (!) 180/79   Pulse 56   Temp 98 °F (36 7 °C) (Oral)   Resp 16   Ht 5' 8" (1 727 m)   Wt (!) 146 kg (321 lb 14 oz)   SpO2 95%   BMI 48 94 kg/m²      No intake or output data in the 24 hours ending 03/19/18 1323    Body mass index is 48 94 kg/m²  General:   Not in acute distress  Pulmonary: clear to auscultation bilaterally and no crackles, no wheezes, chest expansion normal  Cardiovascular:  RRR  Abdomen: soft, nontender, nondistended, no masses or organomegaly  Skin/Extremity: no rashes, no erythema, no peripheral edema  Neurologic: cranial nerves 2-12 are grossly intact, sensations are intact to light touch bilaterally  No evidence of finger-to-nose dysmetria  Deep tendon reflexes are intact, toes are downgoing bilaterally  Psych: Appropriate affect, alert and oriented to person, place and time   Musculoskeletal - Strength:   Right  Left  Site  Right  Left  Site    5 5  S Ab: Shoulder Abductors  5  5  HF: Hip Flexors    5 5  EF: Elbow Flexors  5/   5  5/   5  H Ab: Hip Abductors/   H Ad: Hip Adductors    5  5  EE: Elbow Extensors  5  5  KF: Knee Flexors    5  5  WE: Wrist Extensors  5  5  KE: Knee Extensors    5  5  FF: Finger Flexors  5  5  DR: Dorsi Flexors    5  5  HI: Hand Intrinsics  5  5  PF: Plantar Flexors          Laboratory:    Lab Results   Component Value Date    HGB 16 2 03/19/2018    HCT 47 7 03/19/2018    WBC 8 06 03/19/2018      Lab Results   Component Value Date    BUN 21 03/19/2018     03/19/2018    K 4 3 03/19/2018     03/19/2018    GLUCOSE 135 03/19/2018    CREATININE 1 24 03/19/2018      Lab Results   Component Value Date    PROTIME 12 6 03/19/2018    INR 0 92 03/19/2018       Imaging:  Cta Head And Neck With And Without Contrast    Result Date: 3/19/2018  Impression: No acute intracranial abnormality  No focal stenosis or saccular aneurysm within the [place] of [place]   No hemodynamically significant stenosis within either common or internal carotid artery  Less than 50% stenosis by NASCET criteria  22 mm left thyroid lobe nodule  A nonemergent thyroid ultrasound is recommended for further characterization  No prior studies are available for comparison  Findings are consistent with the preliminary report from [facility] which was provided shortly after completion of the exam  The additional finding of the left thyroid lobe nodule will be communicated by the radiology  liaison  Workstation performed: [id number]     X-ray Chest 2 Views    Result Date: 3/19/2018  Impression: No acute cardiopulmonary disease  Workstation performed: [id number]       Assessment and Recommendations:    72-year-old male with  headache and left-sided numbness most likely secondary to TIA      Impairments:  Impaired functional mobility and ability to perform ADL's      Recommendations:  - MRI of the brain is pending  - Continue PT/OT while inpatient  - Pt is unable to safely return home until he is at the supervision/contact-guard functional level  / modified-independent functional level  - Based upon patient's current functional level, we recommend referral to short-term home assistance with home therapies to allow for achievement of modified-independent functional level          Thank you for allowing the PM&R service to participate in the care of this patient  We will continue to follow Wayne Gutierrez's progress with you   Please do not hesitate to call with questions or concerns    Ganga Álvarez MD   Physical Medicine and Rehabilitation

## 2018-03-19 NOTE — CONSULTS
Consultation - Neurology   José Manuel Barrera 74 y o  male MRN: [id number]  Unit/Bed#: ED 26 Encounter: [id number]      Physician Requesting Consult: Kylah Calabrese MD  Reason for Consult:  Headache and numbness  Hx and PE limited by:     HPI: José Manuel Barrera is a right handed  74 y o  male who presented to the ER with chief complaints of headache and numbness, patient has had history of headaches when he was a teenager and started having headaches about 2 weeks back mostly on the right side on and off associated with photophobia and phonophobia, he also had some numbness of the left hand and the left mouth and by the time he came to the ER his symptoms had resolved, according to the admitting physician's note patient was slightly dizzy and confused for a period of time and has slurred speech but that all his resolved, according to the patient it lasted for a few minutes, currently he is asymptomatic, denies any prior history of similar symptoms, he had a CT of the head and CTA of the head and neck which was unremarkable except for a thyroid nodule  Review of Systems:  See history of present illness for pertinent neurological symptoms  All other systems are negative  Historical Information   Past Medical History:   Diagnosis Date    Hyperlipidemia      History reviewed  No pertinent surgical history  Social History   History   Smoking Status    Never Smoker   Smokeless Tobacco    Never Used     History   Alcohol Use No     History   Drug Use No       Family History:   History reviewed  No pertinent family history      Allergies   Allergen Reactions    Valium [Diazepam] Dizziness       Meds:  All current active meds have been reviewed    Scheduled Meds:  Current Facility-Administered Medications:  acetaminophen 650 mg Oral Q4H PRN Izella Pilling, PA-C    aspirin 325 mg Oral Daily Izella Pilling, PA-C    atorvastatin 40 mg Oral QPM Izella Pilling, PA-C    heparin (porcine) 5,000 Units Subcutaneous [facility] [address] Anita [place], PA-C    metoprolol tartrate 25 mg Oral Q12H [address] Anita [place], PA-C    NON FORMULARY 80 mg Oral Daily Anita [place], PA-C    ondansetron 4 mg Intravenous Q6H PRN Anita Zainab, PA-C    sodium chloride 75 mL/hr Intravenous Continuous Anita [place], PA-C Last Rate: 75 mL/hr (03/19/18 0629)     PRN Meds:   acetaminophen    ondansetron    Physical Exam:   Objective   Vitals:   Vitals:    03/19/18 1000   BP: (!) 195/85   Pulse: 58   Resp: 12   Temp:    SpO2:    ,Body mass index is 48 94 kg/m²  Patient was examined in emergency department bed  General appearance: Cooperative in no acute distress  Head & neck head is atraumatic and normocephalic  Neck is supple with full range of motion  Cardiovascular: Carotid arteriesno carotid bruits  Neurologic:   Patient is alert awake oriented, high functions are intact, speech is fluent  No evidence of any aphasia or dysarthria  Cranial nerve examination reveals visual fields are full to threat, pupils equal and reactive, extraocular movements intact, funduscopic examination could not be done, sensation in the V1 V2 V3 distribution is symmetric, no obvious facial asymmetry noted, tongue is midline and gag is adequate  Motor examination reveals normal tone and bulk, no evidence of any drift to the outstretched extremities, strength is 5/5 preserved bilaterally in both upper and lower extremities, deep tendon reflexes are intact, toes are downgoing  Sensory examination to pinprick light touch proprioception and vibration is preserved bilaterally, patient does not extinguish double simultaneous stimuli    Coordination no evidence of any finger-to-nose dysmetria, no evidence of any dysdiadochokinesia,  Gait could not be tested    Lab Results:Lab Results:   CBC:   Results from last 7 days  Lab Units 03/19/18  0627 03/19/18  0104   WBC Thousand/uL 8 06 9 85   RBC Million/uL 5 02 5 42   HEMOGLOBIN g/dL 16 2 17 3*   HEMATOCRIT % 47 7 52 2*   MCV fL 95 96   PLATELETS Thousands/uL 220  220 250   , BMP/CMP:   Results from last 7 days  Lab Units 03/19/18  0627 03/19/18  0104   SODIUM mmol/L 138 138   POTASSIUM mmol/L 4 3 4 1   CHLORIDE mmol/L 100 98*   CO2 mmol/L 29 30   ANION GAP mmol/L 9 10   BUN mg/dL 21 23   CREATININE mg/dL 1 24 1 41*   GLUCOSE RANDOM mg/dL 135 136   CALCIUM mg/dL 9 3 10 1   AST U/L  --  24   ALT U/L  --  35   ALK PHOS U/L  --  67   TOTAL PROTEIN g/dL  --  8 4*   BILIRUBIN TOTAL mg/dL  --  0 80   EGFR ml/min/1 73sq m 61 52     I have personally reviewed pertinent reports  EEG, Echo, Pathology, and Other Studies: I have personally reviewed pertinent films in PACS    Family, was not present at the bedside for history and examination  Assessment:  1  Headache with numbness differential diagnosis TIA versus complicated migraine  Plan:  Agree with MRI scan of the brain, would recommend patient to be on stroke pathway for possible TIA, agree with aspirin, would recommend a cardiac workup, keep blood pressure cholesterol and sugar under control, also would recommend a sedimentation rate, other management as per primary team, case discussed with primary team     Counseling / Coordination of Care  Total time spent today 45 minutes  Greater than 50% of total time was spent with the patient and / or family counseling and / or coordination of care   A description of the counseling / coordination of care:     Allie Woodall MD  3/19/2018,10:10 AM    "This note has been constructed using a voice recognition system "

## 2018-03-19 NOTE — OCCUPATIONAL THERAPY NOTE
OT consult received, pt cx'd this am 2* elevated BP   OT to follow as able to A w/ safe d/c planning/recommendations     Krystyna Kowalski, OT

## 2018-03-19 NOTE — PHYSICAL THERAPY NOTE
Physical Therapy Evaluation     Patient's Name: Matt Berry    Admitting Diagnosis  Numbness [R20 0]    Problem List  Patient Active Problem List   Diagnosis    TIA (transient ischemic attack)    VASQUEZ (acute kidney injury) ([address] )    Essential hypertension       Past Medical History  Past Medical History:   Diagnosis Date    Hyperlipidemia        Past Surgical History  History reviewed  No pertinent surgical history  03/19/18 0900   Note Type   Note type Eval/Treat   Pain Assessment   Pain Assessment 0-10   Pain Score 8  (upon arrival, lessened at end of session)   Pain Type Acute pain   Pain Location Head   Pain Orientation Right;Frontal   Home Living   Type of [address] One level;Performs ADLs on one level; Able to live on main level with bedroom/bathroom  (0 PRABHU)   Bathroom Shower/Tub Tub/shower unit   Bathroom Toilet Standard   Bathroom Equipment (no DME at baseline)   [address] (no AD at baseline)   Prior Function   Level of [place] Independent with ADLs and functional mobility   Lives With Spouse  (wife works)   Receives Help From [facility] [employer] [facility]   IADLs Independent  (pt does cooking, cleaning; "I'm Mr Yan Garcia")   Falls in the last 6 months 0   Vocational Retired  ()   Comments (+) driving   Restrictions/Precautions   [place] [place] Bearing Precautions Per Order No   Braces or Orthoses (none per pt)   Other Precautions Multiple lines;Telemetry; Fall Risk;Pain  (IV line)   General   Additional Pertinent History Pt reporting he has been battling with HTN since 1999     Family/Caregiver Present No   Cognition   Overall Cognitive Status WFL   Arousal/Participation Alert   Orientation Level Oriented X4   Memory Within functional limits   Following Commands Follows all commands and directions without difficulty   Comments pt agreeable to PT IE   RUE Assessment   RUE Assessment WFL   LUE Assessment   LUE Assessment WFL   RLE Assessment   RLE Assessment WFL  (5/5)   LLE Assessment   LLE Assessment WFL  (5/5)   Coordination   Movements are Fluid and Coordinated 1   Coordination and Movement Description no dysmetria noted, B finger opposition intact   Sensation WFL   Light Touch   RLE Light Touch Grossly intact   LLE Light Touch Grossly intact   Bed Mobility   Supine to Sit Unable to assess   Sit to Supine 5  Supervision   Additional items HOB elevated   Additional Comments Note upon arrival, pt in process of getting to BR with staff, RN reporting pt has been ambulating to/from BR since admission without difficulty  Addendum: Please note PT recommended/offered to retrieve urinal for pt to remain bedlevel, pt refusing noting "I've been up to the bathroom all night, that's where I'm going"  Education attempted, pt declining- noting "it won't work, I need to go to the bathroom"  vitals upon arrival: 58bpm, 95% SPO2 on RA, 182/77mmHg  vitals post session: 60bpm, 98% SPO2 on RA, 195/85 mmHg  RN aware and in process of notifying MD of elevated BP  Note BP was taken with automatic machine on R forearm  PT will hold on exertional activity until cleared by MD    Transfers   Sit to Stand 5  Supervision   Stand to Sit 5  Supervision   Additional Comments Pt returned BTB, all needs in reach, in NAD  Pt reporting he feels comfortable and at his baseline  Noting improvement of HA     Ambulation/Elevation   Gait pattern Short stride  (mild lateral sway)   Gait Assistance 5  Supervision   Additional items Assist x 1;Verbal cues   Assistive Device None   Distance 10' to return from BR, PT deferring any other mobility at this time 2* BP   Stair Management Assistance Not tested   Balance   Static Sitting Normal   Dynamic Sitting Normal   Static Standing Fair +   Dynamic Standing Fair   Ambulatory Fair   Endurance Deficit   Endurance Deficit Yes   Activity Tolerance   Activity Tolerance Patient limited by fatigue;Treatment limited secondary to medical complications (Comment); Other (Comment)  (elevated BP)   Medical Staff Made Aware yes OT Julee   Nurse Made Aware yes, RN Haven Cap verbalized pt appropriate to see, made aware of session outcome/recs  RN cleared pt for OOB mobility- noting pt has been OOB ambulating to bathroom since admission  Recommend that NSG use urinal/bedpan; remain bedlevel and limit exertion given BP readings  Assessment   Prognosis Good   Problem List Decreased endurance; Impaired balance;Decreased mobility; Decreased safety awareness;Pain   Assessment Pt is a 74 y o  male seen for PT evaluation on 3/19/2018 s/p admit to [facility] on 3/19/2018 w/ TIA (transient ischemic attack)  Per provider note, chart review, "presented to the ER with chief complaints of headache and numbness, patient has had history of headaches when he was a teenager and started having headaches about 2 weeks back mostly on the right side on and off associated with photophobia and phonophobia, he also had some numbness of the left hand and the left mouth and by the time he came to the ER his symptoms had resolved, according to the admitting physician's note patient was slightly dizzy and confused for a period of time and has slurred speech but that all his resolved, according to the patient it lasted for a few minutes, currently he is asymptomatic"  PT consulted to assess pt's functional mobility and d/c needs  Order placed for PT eval and tx, w/ up w/ A order  Performed at least 2 patient identifiers during session: Name and wristband  Comorbidities affecting pt's physical performance at time of assessment include: HLD, HTN, obesity  PTA, pt was independent w/ all functional mobility w/ no AD/DME, ambulates unrestricted distances and all terrain, has 0 PRABHU, lives w/ wife in 1 level home and retired   Personal factors affecting pt at time of IE include: inability to ambulate household distances, inability to navigate level surfaces w/o external assistance, decreased initiation and engagement and limited insight into impairments  Please find objective findings from PT assessment regarding body systems outlined above with impairments and limitations including impaired balance, decreased endurance, gait deviations and decreased safety awareness, as well as mobility assessment (need for SBA assist w/ all phases of mobility when usually ambulating independently and need for cueing for mobility technique; complete mobility assessment deferred 2* BP- will hold OOB/exertional activities until cleared by MD)  The following objective measures performed on IE also reveal limitations: Barthel Index: 60/100 and Modified Emili: 3 (moderate disability)  Pt's clinical presentation is currently unstable/unpredictable seen in pt's presentation of elevated BP, need for input for task focus and mobility technique, on telemetry monitoring, unstable medical status and pending further medical management/neurology/MRI  Pt to benefit from continued PT tx to address deficits as defined above and maximize level of functional independent mobility and consistency  From PT/mobility standpoint, recommendation at time of d/c would be TBD pending further assessment when cleared by MD in order to facilitate return to PLOF     Barriers to Discharge None   Goals   Patient Goals to return home   STG Expiration Date 03/29/18   Short Term Goal #1 In 7-10 days: Perform all bed mobility tasks independently to decrease caregiver burden, when cleared by MD- perform all transfers independently to improve independence, Ambulate > 150 ft  when vitals stable and cleared by MD with least restrictive assistive device independently w/o LOB and w/ normalized gait pattern 100% of the time, Increase all balance 1/2 grade to decrease risk for falls and Improve Barthel Index score to 75 or greater to facilitate independence   Treatment Day 0   Plan   Treatment/Interventions Functional transfer training;LOLY strengthening/ROM; Elevations; Therapeutic exercise; Endurance training;Patient/family training;Equipment eval/education; Bed mobility;Gait training;Spoke to nursing;OT   PT Frequency 5x/wk   Recommendation   Recommendation Other (Comment)  (TBD pending further assessment when cleared by MD)   Equipment Recommended (TBD, anticipate none)   PT - OK to Discharge No   Modified [place] Scale   Modified Emili Scale 3   Barthel Index   Feeding 10   Bathing 0   Grooming Score 5   Dressing Score 10   Bladder Score 10   Bowels Score 10   Toilet Use Score 5   Transfers (Bed/Chair) Score 10   Mobility (Level Surface) Score 0   Stairs Score 0   Barthel Index Score 60       Kaia Braun, PT, DPT

## 2018-03-20 ENCOUNTER — APPOINTMENT (OUTPATIENT)
Dept: MRI IMAGING | Facility: HOSPITAL | Age: 68
DRG: 069 | End: 2018-03-20
Payer: COMMERCIAL

## 2018-03-20 LAB
ALBUMIN SERPL BCP-MCNC: 3.4 G/DL (ref 3.5–5)
ALP SERPL-CCNC: 52 U/L (ref 46–116)
ALT SERPL W P-5'-P-CCNC: 28 U/L (ref 12–78)
ANION GAP SERPL CALCULATED.3IONS-SCNC: 9 MMOL/L (ref 4–13)
AST SERPL W P-5'-P-CCNC: 16 U/L (ref 5–45)
ATRIAL RATE: 64 BPM
ATRIAL RATE: 69 BPM
ATRIAL RATE: 80 BPM
BASOPHILS # BLD AUTO: 0.03 THOUSANDS/ΜL (ref 0–0.1)
BASOPHILS NFR BLD AUTO: 0 % (ref 0–1)
BILIRUB SERPL-MCNC: 0.7 MG/DL (ref 0.2–1)
BUN SERPL-MCNC: 20 MG/DL (ref 5–25)
CALCIUM SERPL-MCNC: 8.9 MG/DL (ref 8.3–10.1)
CHLORIDE SERPL-SCNC: 103 MMOL/L (ref 100–108)
CO2 SERPL-SCNC: 28 MMOL/L (ref 21–32)
CREAT SERPL-MCNC: 1.14 MG/DL (ref 0.6–1.3)
EOSINOPHIL # BLD AUTO: 0.01 THOUSAND/ΜL (ref 0–0.61)
EOSINOPHIL NFR BLD AUTO: 0 % (ref 0–6)
ERYTHROCYTE [DISTWIDTH] IN BLOOD BY AUTOMATED COUNT: 13.9 % (ref 11.6–15.1)
GFR SERPL CREATININE-BSD FRML MDRD: 67 ML/MIN/1.73SQ M
GLUCOSE P FAST SERPL-MCNC: 136 MG/DL (ref 65–99)
GLUCOSE SERPL-MCNC: 136 MG/DL (ref 65–140)
HCT VFR BLD AUTO: 44.5 % (ref 36.5–49.3)
HGB BLD-MCNC: 15 G/DL (ref 12–17)
LYMPHOCYTES # BLD AUTO: 1.35 THOUSANDS/ΜL (ref 0.6–4.47)
LYMPHOCYTES NFR BLD AUTO: 16 % (ref 14–44)
MCH RBC QN AUTO: 32.3 PG (ref 26.8–34.3)
MCHC RBC AUTO-ENTMCNC: 33.7 G/DL (ref 31.4–37.4)
MCV RBC AUTO: 96 FL (ref 82–98)
MONOCYTES # BLD AUTO: 1.1 THOUSAND/ΜL (ref 0.17–1.22)
MONOCYTES NFR BLD AUTO: 13 % (ref 4–12)
NEUTROPHILS # BLD AUTO: 5.84 THOUSANDS/ΜL (ref 1.85–7.62)
NEUTS SEG NFR BLD AUTO: 70 % (ref 43–75)
NRBC BLD AUTO-RTO: 0 /100 WBCS
P AXIS: 10 DEGREES
P AXIS: 47 DEGREES
P AXIS: 56 DEGREES
PLATELET # BLD AUTO: 226 THOUSANDS/UL (ref 149–390)
PMV BLD AUTO: 9.8 FL (ref 8.9–12.7)
POTASSIUM SERPL-SCNC: 3.9 MMOL/L (ref 3.5–5.3)
PR INTERVAL: 118 MS
PR INTERVAL: 162 MS
PR INTERVAL: 170 MS
PROT SERPL-MCNC: 6.5 G/DL (ref 6.4–8.2)
QRS AXIS: 44 DEGREES
QRS AXIS: 5 DEGREES
QRS AXIS: 56 DEGREES
QRSD INTERVAL: 108 MS
QRSD INTERVAL: 110 MS
QRSD INTERVAL: 116 MS
QT INTERVAL: 412 MS
QT INTERVAL: 436 MS
QT INTERVAL: 448 MS
QTC INTERVAL: 462 MS
QTC INTERVAL: 467 MS
QTC INTERVAL: 475 MS
RBC # BLD AUTO: 4.65 MILLION/UL (ref 3.88–5.62)
SODIUM SERPL-SCNC: 140 MMOL/L (ref 136–145)
T WAVE AXIS: -39 DEGREES
T WAVE AXIS: 103 DEGREES
T WAVE AXIS: 237 DEGREES
T4 FREE SERPL-MCNC: 1.09 NG/DL (ref 0.76–1.46)
TROPONIN I SERPL-MCNC: 0.03 NG/ML
TSH SERPL DL<=0.05 MIU/L-ACNC: 0.84 UIU/ML (ref 0.36–3.74)
VENTRICULAR RATE: 64 BPM
VENTRICULAR RATE: 69 BPM
VENTRICULAR RATE: 80 BPM
WBC # BLD AUTO: 8.35 THOUSAND/UL (ref 4.31–10.16)

## 2018-03-20 PROCEDURE — 84439 ASSAY OF FREE THYROXINE: CPT | Performed by: PHYSICIAN ASSISTANT

## 2018-03-20 PROCEDURE — 84484 ASSAY OF TROPONIN QUANT: CPT | Performed by: PHYSICIAN ASSISTANT

## 2018-03-20 PROCEDURE — 93306 TTE W/DOPPLER COMPLETE: CPT | Performed by: INTERNAL MEDICINE

## 2018-03-20 PROCEDURE — 70551 MRI BRAIN STEM W/O DYE: CPT

## 2018-03-20 PROCEDURE — 84443 ASSAY THYROID STIM HORMONE: CPT | Performed by: PHYSICIAN ASSISTANT

## 2018-03-20 PROCEDURE — 80053 COMPREHEN METABOLIC PANEL: CPT | Performed by: PHYSICIAN ASSISTANT

## 2018-03-20 PROCEDURE — 85025 COMPLETE CBC W/AUTO DIFF WBC: CPT | Performed by: PHYSICIAN ASSISTANT

## 2018-03-20 PROCEDURE — 93010 ELECTROCARDIOGRAM REPORT: CPT | Performed by: INTERNAL MEDICINE

## 2018-03-20 RX ORDER — HYDRALAZINE HYDROCHLORIDE 20 MG/ML
5 INJECTION INTRAMUSCULAR; INTRAVENOUS EVERY 6 HOURS PRN
Status: DISCONTINUED | OUTPATIENT
Start: 2018-03-20 | End: 2018-03-24 | Stop reason: HOSPADM

## 2018-03-20 RX ORDER — AMLODIPINE BESYLATE 5 MG/1
5 TABLET ORAL DAILY
Status: DISCONTINUED | OUTPATIENT
Start: 2018-03-20 | End: 2018-03-22

## 2018-03-20 RX ORDER — LORAZEPAM 2 MG/ML
0.5 INJECTION INTRAMUSCULAR ONCE
Status: COMPLETED | OUTPATIENT
Start: 2018-03-20 | End: 2018-03-20

## 2018-03-20 RX ORDER — ACETAMINOPHEN 325 MG/1
650 TABLET ORAL EVERY 6 HOURS SCHEDULED
Status: DISCONTINUED | OUTPATIENT
Start: 2018-03-20 | End: 2018-03-24 | Stop reason: HOSPADM

## 2018-03-20 RX ORDER — LORAZEPAM 2 MG/ML
INJECTION INTRAMUSCULAR
Status: DISPENSED
Start: 2018-03-20 | End: 2018-03-20

## 2018-03-20 RX ADMIN — HEPARIN SODIUM 5000 UNITS: 5000 INJECTION, SOLUTION INTRAVENOUS; SUBCUTANEOUS at 21:28

## 2018-03-20 RX ADMIN — SODIUM CHLORIDE 2 MG/HR: 0.9 INJECTION, SOLUTION INTRAVENOUS at 09:16

## 2018-03-20 RX ADMIN — ATORVASTATIN CALCIUM 40 MG: 40 TABLET, FILM COATED ORAL at 17:45

## 2018-03-20 RX ADMIN — AMLODIPINE BESYLATE 5 MG: 5 TABLET ORAL at 11:06

## 2018-03-20 RX ADMIN — SODIUM CHLORIDE 2.5 MG/HR: 0.9 INJECTION, SOLUTION INTRAVENOUS at 05:53

## 2018-03-20 RX ADMIN — HEPARIN SODIUM 5000 UNITS: 5000 INJECTION, SOLUTION INTRAVENOUS; SUBCUTANEOUS at 14:19

## 2018-03-20 RX ADMIN — ACETAMINOPHEN 650 MG: 325 TABLET, FILM COATED ORAL at 17:46

## 2018-03-20 RX ADMIN — LORAZEPAM 0.5 MG: 2 INJECTION, SOLUTION INTRAMUSCULAR; INTRAVENOUS at 11:01

## 2018-03-20 RX ADMIN — ACETAMINOPHEN 650 MG: 325 TABLET, FILM COATED ORAL at 11:10

## 2018-03-20 RX ADMIN — ASPIRIN 325 MG: 325 TABLET ORAL at 08:34

## 2018-03-20 RX ADMIN — SODIUM CHLORIDE 75 ML/HR: 0.9 INJECTION, SOLUTION INTRAVENOUS at 08:36

## 2018-03-20 NOTE — PLAN OF CARE
Problem: DISCHARGE PLANNING - CARE MANAGEMENT  Goal: Discharge to post-acute care or home with appropriate resources  INTERVENTIONS:  - Conduct assessment to determine patient/family and health care team treatment goals, and need for post-acute services based on payer coverage, community resources, and patient preferences, and barriers to discharge  - Address psychosocial, clinical, and financial barriers to discharge as identified in assessment in conjunction with the patient/family and health care team  - Arrange appropriate level of post-acute services according to patients   needs and preference and payer coverage in collaboration with the physician and health care team  - Communicate with and update the patient/family, physician, and health care team regarding progress on the discharge plan  - Arrange appropriate transportation to post-acute venues  Outcome: Progressing  CM met with pt and wife Alessandra Hina at bedside  Pt lives with his wife in a one story house with 1 PRABHU  He has no problem navigating steps and is independent with ADL's  He uses no DME's  He has a cane, but doesn't use it  He has never been in rehab, or used OP/PT or [place] services  Pt denies substance abuse or mental health issues  His PCP is Dr Az Fitch  He uses [facility] and has no problem with his co-pays  He does not have a POA or Advanced Directive and does not want info at this time,  Pt is a retired  and he does drive  Wife will transport home when medically cleared  CM discussed d/c needs including [place] services, but pt and wife do not feel that will be necessary at this time  CM department will continue to follow through hospitalization  OBS status reviewed and signed by wife  Copy to pt and copy to MR for scanning

## 2018-03-20 NOTE — CASE MANAGEMENT
Initial Clinical Review    OBSERVATION 3/19 @ 0315 CHANGED TO INPATIENT 3/20 @ 2048  Due to continued treatment of hypertension, TIA    Admission: Date/Time/Statement:    03/20/18 1349  Inpatient Admission Once     Transfer Service: Critical Care/ICU       Question Answer Comment   Admitting Physician Anabel Simpson    Level of Care Level 2 Stepdown / HOT    Estimated length of stay More than 2 Midnights    Certification I certify that inpatient services are medically necessary for this patient for a duration of greater than two midnights  See H&P and MD Progress Notes for additional information about the patient's course of treatment  Orders Placed This Encounter   Procedures    Place in Observation (expected length of stay for this patient is less than two midnights)     Standing Status:   Standing     Number of Occurrences:   1     Order Specific Question:   Admitting Physician     Answer:   Luca De Los Santos [[id number]]     Order Specific Question:   Level of Care     Answer:   Med Surg [16]     ED: Date/Time/Mode of Arrival:   ED Arrival Information     Expected Arrival Acuity Means of Arrival Escorted By Service Admission Type    - 3/19/2018 00:23 Emergent Walk-In [facility] Emergency    Arrival Complaint    neck pain numbness        Chief Complaint:   Chief Complaint   Patient presents with    Numbness     pt co of L face and hand numbness onset tonight about 9pm  "i have had a headache for about 2 weeks now"  History of Illness: Jennifer Garza is a 74 y o  male who presents with complaints of headache and numbness  Patient states last 2 weeks he has had a headache  Patient states around 8:00 p m  tonight he started to experience left hand and left mouth numbness  Patient states that the symptoms have since resolved  Patient states he was dizzy and confused for a period of time and has slurred speech but that this has all resolved       ED Vital Signs:   ED Triage Vitals Temperature Pulse Respirations Blood Pressure SpO2   03/19/18 0037 03/19/18 0037 03/19/18 0037 03/19/18 0038 03/19/18 0038   98 °F (36 7 °C) 70 20 (!) 270/134 95 %      Temp Source Heart Rate Source Patient Position - Orthostatic VS BP Location FiO2 (%)   03/19/18 0037 03/19/18 0037 03/19/18 0037 03/19/18 0037 --   Oral Monitor Lying Right arm       Pain Score       03/19/18 0037       No Pain        Wt Readings from Last 1 Encounters:   03/19/18 (!) 146 kg (321 lb 14 oz)     Vital Signs (abnormal):     03/19/18 1735  --  74  21  160/70  --  94 %  --  --   03/19/18 1716  98 2 °F (36 8 °C)  80  21   232/100  143  97 %  --  --   03/19/18 1700  --  82   24   233/103  148  95 %  --  --   03/19/18 1615  --  78   30   213/87  140  97 %  --  --   03/19/18 1430  --  71  16   189/86  123  98 %  --  --   03/19/18 1414  --  74   26   187/94  --  98 %  --  --   03/19/18 1346  --  68  20  147/76  105  96 %  --  --   03/19/18 1323  --  65  18   220/100  --  97 %  None (Room air)  --   03/19/18 1315  --  66  22   220/100  143  96 %  --  --   03/19/18 1100  --  56  16   180/79  --  95 %  --  --   03/19/18 1000  --  58  12   195/85  --  94 %  --  --   03/19/18 0915  --  65  18   201/91  --  --  --  --   03/19/18 0900  --  58  16   190/86  --  95 %  --  --   03/19/18 0800  --  57  16   190/80  --  93 %  --  --   03/19/18 0700  --  60  19   174/80  --  96 %  --  --   03/19/18 0645  --  59  18   201/93  --  96 %  None (Room air)  Lying   03/19/18 0600  --  61  20   198/92  --  98 %  None (Room air)  Lying   03/19/18 0500  --  59  20   189/89  --  95 %  None (Room air)  Lying   03/19/18 0400  --  62  20   196/91  --  98 %  None (Room air)  Lying   03/19/18 0230  --  64  20   198/91  --  97 %  None (Room air)  Lying     Abnormal Labs/Diagnostic Test Results:     Cl 98 BUN 23 Creatinine 1 41  Troponin < 0 02  H/H 17 3/52 2  Chol 234    CTA head/neck  No acute intracranial abnormality    No focal stenosis or saccular aneurysm within the [place] of [place]  No hemodynamically significant stenosis within either common or internal carotid artery   Less than 50% stenosis by NASCET criteria  22 mm left thyroid lobe nodule   A nonemergent thyroid ultrasound is recommended for further characterization    EKG 3/19 1356  Normal sinus rhythm  Marked ST abnormality, possible inferolateral subendocardial injury  Left ventricular hypertrophy    EKG 3/19 0040  Normal sinus rhythm  T wave abnormality, consider lateral ischemia  Poor anterior R wave progression is noted  Prolonged QT    ED Treatment:   Medication Administration from 03/19/2018 0023 to 03/19/2018 1612       Date/Time Order Dose Route Action Comments     03/19/2018 0106 labetalol (NORMODYNE) injection 10 mg 10 mg Intravenous Given      03/19/2018 0112 metoclopramide (REGLAN) injection 10 mg 10 mg Intravenous Given      03/19/2018 0153 iodixanol (VISIPAQUE) 320 MG/ML injection 100 mL 100 mL Intravenous Given      03/19/2018 0327 HYDROmorphone (DILAUDID) injection 0 5 mg 0 5 mg Intravenous Given      03/19/2018 0326 aspirin tablet 325 mg 325 mg Oral Given      03/19/2018 1314 acetaminophen (TYLENOL) tablet 650 mg 650 mg Oral Given      03/19/2018 1546 heparin (porcine) subcutaneous injection 5,000 Units 5,000 Units Subcutaneous Given      03/19/2018 0828 metoprolol tartrate (LOPRESSOR) tablet 25 mg 25 mg Oral Given      03/19/2018 1155 azilsartan medoxomil (EDARBI) tablet 80 mg 80 mg Oral Given      03/19/2018 0629 sodium chloride 0 9 % infusion 75 mL/hr Intravenous New Bag      03/19/2018 1323 hydrALAZINE (APRESOLINE) injection 10 mg 10 mg Intravenous Given           Past Medical/Surgical History:    Active Ambulatory Problems     Diagnosis Date Noted    No Active Ambulatory Problems     Resolved Ambulatory Problems     Diagnosis Date Noted    No Resolved Ambulatory Problems     Past Medical History:   Diagnosis Date    Hyperlipidemia        Admitting Diagnosis: TIA (transient ischemic attack) [G45 9]  Numbness [R20 0]  Essential hypertension [I10]  Left sided numbness [R20 0]  Headache [R51]    Age/Sex: 74 y o  male    Assessment/Plan:     Impression:  Principal Problem:    TIA (transient ischemic attack)  Active Problems:    VASQUEZ (acute kidney injury) ([address] )    Essential hypertension  Resolved Problems:    * No resolved hospital problems   *     Hypertensive emergency     Plan:  · Will transfer to ICU for hypertensive emergency  · Will start nitro drip for BP control, place Andrew  · VASQUEZ-repeat BMP and follow renal indices-creatinine had improved on second BMP  · Neurology following-continue ASA  · Consult  Cardiology for EKG changes  · Trend troponins  · Serial neuro exams     VTE Prophylaxis: Heparin  / sequential compression device   Code Status: Full            Admission Orders:  Scheduled Meds:   Current Facility-Administered Medications:  acetaminophen 650 mg Oral Q4H PRN Tammy Good PA-C    aspirin 325 mg Oral Daily Tammy SALINA Good-ETHEL    atorvastatin 40 mg Oral QPM Tammy Good PA-C    heparin (porcine) 5,000 Units Subcutaneous [facility] Tammy Good PA-C    morphine injection 2 mg Intravenous Once Lars Renee PA-C    niCARdipine 1-15 mg/hr Intravenous Titrated Nena Good PA-C Last Rate: 2 mg/hr (03/20/18 0739)   nitroglycerin 0 4 mg Sublingual Q5 Min PRN Nena Good PA-C    ondansetron 4 mg Intravenous Q6H PRN Tammy Good PA-C    oxyCODONE-acetaminophen 1 tablet Oral Q4H PRN Nena Good PA-C    sodium chloride 75 mL/hr Intravenous Continuous Tammy Good PA-C Last Rate: 75 mL/hr (03/19/18 1928)     Continuous Infusions:   niCARdipine 1-15 mg/hr Last Rate: 2 mg/hr (03/20/18 0739)   sodium chloride 75 mL/hr Last Rate: 75 mL/hr (03/19/18 1928)     PRN Meds:   acetaminophen    nitroglycerin    ondansetron    oxyCODONE-acetaminophen    ICU continuous cardiac/pulmonary monitoring  A-line BP monitoring  Regular diet  Neuro checks q 1h x 4, then q 2 hours  MRI brain  Consult cardiology  SCD's  Echo  Consult neurology  ---------------------------------------------------------------------------------------------  Neurology progress note   Calos Robins is a right handed  74 y o  male who presented to the ER with chief complaints of headache and numbness, patient has had history of headaches when he was a teenager and started having headaches about 2 weeks back mostly on the right side on and off associated with photophobia and phonophobia, he also had some numbness of the left hand and the left mouth and by the time he came to the ER his symptoms had resolved, according to the admitting physician's note patient was slightly dizzy and confused for a period of time and has slurred speech but that all his resolved, according to the patient it lasted for a few minutes, currently he is asymptomatic, denies any prior history of similar symptoms, he had a CT of the head and CTA of the head and neck which was unremarkable except for a thyroid nodule  1   Headache with numbness differential diagnosis TIA versus complicated migraine      Plan:  Agree with MRI scan of the brain, would recommend patient to be on stroke pathway for possible TIA, agree with aspirin, would recommend a cardiac workup, keep blood pressure cholesterol and sugar under control, also would recommend a sedimentation rate, other management as per primary team, case discussed with primary team   ------------------------------------------------------------------------------------------------------  Consult cardiology  HPI: Cardiologist Dr Chaparro Esposito is a 74y o  year old male who has a history of CKD3, HTN, HLD presenting with accelerated BP in the setting of chest pain and stroke-like symptoms  Had headaches for 2 weeks, developed substernal chest pressure with L face and L arm numbness and tingling  Per wife, patient also had some slurred speech  Has occasional leg swelling   Denies SOB, palpitations  No prior history of CVA, no cardiac history  Patient arrived in ED with /134, but BP has been labile since admission today  Patient had diffuse ischemic EKG changes after   receiving hydralazine  Has been compliant on medications at home  Assessment/Plan:  1  HTN emergency: BP initially peaked at 270/134  Last recorded /94  Will be in ICU for further management  Recommend nitroglycerin drip  Recommend Coreg 12 5 mg BID  Recommend renal doppler      2  Chest pain: Ischemic EKG changes as above  Initial troponin negative, continue to trend  Chest pain likely secondary to HTN emergency  Will need BP control  ECHO ordered, will assess EF and regional wall motion abnormalities  Further ischemic workup after BP is controlled  Continue ASA, statin      3  Stroke-like symptoms: Per neurology, TIA vs complicated migraine  Neurology following, MRI pending  HTN emergency may be a contributing factor  ECHO ordered as above, no evidence of A Fib thus far  Continue ASA, statin      4  HLD: Continue statin      Thank you,  [address] [place] [address] in the [facility] by Loki Christianson for 2017  Network Utilization Review Department  Phone: [phone]; Fax [phone]  ATTENTION: The Network Utilization Review Department is now centralized for our 7 Facilities  Make a note that we have a new phone and fax numbers for our Department  Please call with any questions or concerns to [phone] and carefully follow the prompts so that you are directed to the right person  All voicemails are confidential  Fax any determinations, approvals, denials, and requests for initial or continue stay review clinical to [phone]  Due to HIGH CALL volume, it would be easier if you could please send faxed requests to expedite your requests and in part, help us provide discharge notifications faster

## 2018-03-20 NOTE — PROGRESS NOTES
Progress Note - Critical Care   Marcelino Casanova 74 y o  male MRN: [id number]  Unit/Bed#:  Encounter: [id number]    Attending Physician: Laura Gay MD      ______________________________________________________________________  Assessment and Plan:   Principal Problem:    TIA (transient ischemic attack)  Active Problems:    VASQUEZ (acute kidney injury) ([facility] [address] )    Essential hypertension  Resolved Problems:    * No resolved hospital problems  *        Neuro:  Continue monitor neurologic status, monitor for focal deficits    CV:  Hypertensive crisis:  Patient initially started on NTG drip which was changed to nicardipine drip due to extreme sensitivity to NTG  Arterial line placed for close monitoring  Repeat EKG obtained, no new changes  Continue to monitor troponins    Pulm:  No current issues, encourage patient to deep breathe    GI:  H1 blocker for PUD prophylaxis    : Monitor I&O closely, monitor renal indices    F/E/N:  Monitor replete all electrolytes as indicated    ID:  No indication for antibiotics at this time    Heme:  Monitor H/H    Endo:  Monitor blood glucose and thyroid studies       Disposition:  Remain in ICU at this time    Code Status: Level 1 - Full Code    Counseling / Coordination of Care  Total Critical Care time spent 35 minutes excluding procedures, teaching and family updates      ______________________________________________________________________    Chief Complaint:  C/o headache    24 Hour Events:  Patient in hypertensive crisis, was started on NTG drip which needed to be changed to nicardipine due to extreme sensitivity to the NTG       ______________________________________________________________________    Physical Exam:   Constitutional:  No acute distress  HEENT:  Normocephalic, atraumatic, pupils equal reactive to light, EOM intact, sclera anicteric, neck supple, no JVD, trachea midline, airway patent  Pulm:  Lungs equal and clear to auscultation  CV:  Heart rate regular rhythm, S1-S2 no gallops murmurs or rubs  ABD:  Soft/nontender, bowel sounds intact  :  Deferred  M/S:  Good range of motion x4  Skin:  Dry/intact  Neuro:  No focal deficits  Mental Status: In proper sensorium     ______________________________________________________________________  Vitals:    18 0100 18 0130 18 0200 18 0244   BP:       BP Location:       Pulse: 62 65 86    Resp: 14 14 17    Temp:    98 7 °F (37 1 °C)   TempSrc:    Oral   SpO2: 93% 93% 97%    Weight:       Height:                  Temperature:   Temp (24hrs), Av 4 °F (36 9 °C), Min:97 7 °F (36 5 °C), Max:98 9 °F (37 2 °C)    Current Temperature: 98 7 °F (37 1 °C)  Weights:   IBW: 68 4 kg    Body mass index is 48 94 kg/m²  Weight (last 2 days)     Date/Time   Weight    18 0037  (!)  146 (321 87)            Hemodynamic Monitoring:  N/A     Non-Invasive/Invasive Ventilation Settings:  Respiratory    Lab Data (Last 4 hours)    None         O2/Vent Data (Last 4 hours)    None              No results found for: PHART, TBG4YSV, PO2ART, AEX4GEM, K1UIMSUF, BEART, SOURCE  SpO2: SpO2: 97 %  Intake and Outputs:  I/O        0701 -  0700  07 -  0700    I V  (mL/kg)  1235 8 (8 5)    Total Intake(mL/kg)  1235 8 (8 5)    Urine (mL/kg/hr)  500 (0 1)    Total Output   500    Net   +735 8              UOP:  QS/hour   Nutrition:        Diet Orders            Start     Ordered    18 0404  Diet Regular; Regular House  Diet effective now     Question Answer Comment   Diet Type Regular    Regular Regular House    RD to adjust diet per protocol?  Yes        18 0403          Labs:     Results from last 7 days  Lab Units 18  0627 18  0104   WBC Thousand/uL 8 06 9 85   HEMOGLOBIN g/dL 16 2 17 3*   HEMATOCRIT % 47 7 52 2*   PLATELETS Thousands/uL 220  220 250       Results from last 7 days  Lab Units 18  1546 18  0627 18  0104   SODIUM mmol/L 137 138 138   POTASSIUM mmol/L 4 5 4 3 4 1   CHLORIDE mmol/L 100 100 98*   CO2 mmol/L 26 29 30   BUN mg/dL 19 21 23   CREATININE mg/dL 1 11 1 24 1 41*   CALCIUM mg/dL 10 0 9 3 10 1   TOTAL PROTEIN g/dL  --   --  8 4*   BILIRUBIN TOTAL mg/dL  --   --  0 80   ALK PHOS U/L  --   --  67   ALT U/L  --   --  35   AST U/L  --   --  24   GLUCOSE RANDOM mg/dL 121 135 136         No results found for: PHOS     Results from last 7 days  Lab Units 03/19/18  0104   INR  0 92   PTT seconds 27       0  Lab Value Date/Time   TROPONINI 0 03 03/20/2018 0145   TROPONINI 0 04 03/19/2018 2238   TROPONINI <0 02 03/19/2018 1942   TROPONINI <0 02 03/19/2018 1546   TROPONINI <0 02 03/19/2018 0104         ABG:No results found for: PHART, FUY2DKL, PO2ART, FVK3NXJ, W5TZZWXL, BEART, SOURCE  Imaging:  I have personally reviewed pertinent reports  EKG: SR  Micro:  No results found for: Inderjit Held, SPUTUMCULTUR  Allergies:    Allergies   Allergen Reactions    Valium [Diazepam] Dizziness     Medications:   Scheduled Meds:  Current Facility-Administered Medications:  acetaminophen 650 mg Oral Q4H PRN Odette Hanks PA-C    aspirin 325 mg Oral Daily Odette Hanks PA-C    atorvastatin 40 mg Oral QPM Odette Hanks PA-C    heparin (porcine) 5,000 Units Subcutaneous [facility] Odette Hanks PA-C    morphine injection 2 mg Intravenous Once Senait Bucio PA-C    niCARdipine 1-15 mg/hr Intravenous Titrated Senait Bucio PA-C Last Rate: Stopped (03/20/18 0000)   nitroglycerin 0 4 mg Sublingual Q5 Min PRN Senait Bucio PA-C    ondansetron 4 mg Intravenous Q6H PRN Odette Hanks PA-C    oxyCODONE-acetaminophen 1 tablet Oral Q4H PRN Senait Bucio PA-C    sodium chloride 75 mL/hr Intravenous Continuous Odette Hanks PA-C Last Rate: 75 mL/hr (03/19/18 1928)     Continuous Infusions:  niCARdipine 1-15 mg/hr Last Rate: Stopped (03/20/18 0000)   sodium chloride 75 mL/hr Last Rate: 75 mL/hr (03/19/18 1928)     PRN Meds:    acetaminophen 650 mg Q4H PRN nitroglycerin 0 4 mg Q5 Min PRN   ondansetron 4 mg Q6H PRN   oxyCODONE-acetaminophen 1 tablet Q4H PRN     VTE Pharmacologic Prophylaxis: Heparin  VTE Mechanical Prophylaxis: sequential compression device  Invasive lines and devices: Invasive Devices     Peripheral Intravenous Line            Peripheral IV 03/19/18 Left Arm 1 day    Peripheral IV 03/19/18 Right Wrist less than 1 day          Arterial Line            Arterial Line 03/19/18 Radial less than 1 day                     Portions of the record may have been created with voice recognition software  Occasional wrong word or "sound a like" substitutions may have occurred due to the inherent limitations of voice recognition software  Read the chart carefully and recognize, using context, where substitutions have occurred      Jerry Manning PA-C

## 2018-03-20 NOTE — PROGRESS NOTES
Progress Note - ICU Transfer to SD/MS tele   Hillary Antunez 74 y o  male MRN: [id number]  [address] [facility]   Unit/Bed#:  Encounter: [id number]    Code Status: Level 1 - Full Code  POA:    POLST:      Reason for ICU admission:  Hypertensive urgency    Active problems:   Principal Problem:    Hypertensive Urgency      TIA (transient ischemic attack)  Active Problems:    VASQUEZ (acute kidney injury) ([facility] [address] )    Essential hypertension  Resolved Problems:    * No resolved hospital problems  *      Consultants:   Cardiology    History of Present Illness:   Hillary Antunez is 72-year-old male receive a past medical history of hypertension, hyperlipidemia who presented to the ED 3/19/18 for evaluation of headache and facial numbness  Upon arrival patient was found to have systolic BP of 223, with concurrent headache and facial numbness  Stat CT head was obtained without acute intracranial abnormalities identified he received 1 time dose of labetalol with a significant decrease in systolic BP to 985  Proceeding BP dropped patient reported worsening headache with associated chest pain  Despite negative troponins patient had new ST depressions in inferior leads  For this reason Cardiology was consulted  Patient was subsequently admitted to ICU and placed on nitro gtt    Summary of clinical course:     Patient acutely sensitive to nitro gtt, for which she was transitioned to nicardipine over the course of the evening  Systolic blood pressures have been within goal of 180-200  This morning patient was transitioned off of Cardene  He has been started on Norvasc 5 mg daily  He notes slight improvement in headache  0 4 ongoing headache and left facial numbness patient did receive full stroke pathway workup given history of TIA  MRI without ischemic infarct  He has remained hemodynamically stable without any further hypertensive events  Patient will require slow titration of antihypertensive      Recent or scheduled procedures:   CTHNo acute intracranial abnormality  No focal stenosis or saccular aneurysm within the [place]    No hemodynamically significant stenosis within either common or internal carotid artery  Less than 50% stenosis by NASCET criteria    ECHO/; EF of 55-65%  No regional wall motion abnormalities  Grade 1 diastolic dysfunction  MRI: No significant focal brain parenchymal abnormalities      Cultures: N/A       Mobilization Plan:  Up out of bed with assistance  PT/OT    Nutrition Plan:  Cardiac diet    Initial Physical Therapy Recommendations: pending  Initial Occupational Therapy Recommendations: pending  Initial /Plan:following    Spoke with Dr Pawan Gray regarding transfer  Please call [phone] with any questions or concerns  Portions of the record may have been created with voice recognition software  Occasional wrong word or "sound a like" substitutions may have occurred due to the inherent limitations of voice recognition software  Read the chart carefully and recognize, using context, where substitutions have occurred      Elba Nevarez PA-C

## 2018-03-20 NOTE — SOCIAL WORK
CM met with pt and wife Trinity Blair at bedside  Pt lives with his wife in a one story house with 1 PRABHU  He has no problem navigating steps and is independent with ADL's  He uses no DME's  He has a cane, but doesn't use it  He has never been in rehab, or used OP/PT or [facility] services  Pt denies substance abuse or mental health issues  His PCP is Dr Cruz Chamberlain  He uses [facility] and has no problem with his co-pays  He does not have a POA or Advanced Directive and does not want info at this time,  Pt is a retired  and he does drive  Wife will transport home when medically cleared  CM discussed d/c needs including [facility] services, but pt and wife do not feel that will be necessary at this time  CM department will continue to follow through hospitalization  OBS status reviewed and signed by wife  Copy to pt and copy to MR for scanning

## 2018-03-21 PROBLEM — I21.4 NSTEMI (NON-ST ELEVATED MYOCARDIAL INFARCTION) (HCC): Status: ACTIVE | Noted: 2018-03-21

## 2018-03-21 PROBLEM — I50.32 CHRONIC DIASTOLIC CHF (CONGESTIVE HEART FAILURE) (HCC): Status: ACTIVE | Noted: 2018-03-21

## 2018-03-21 PROBLEM — R06.89 RESPIRATORY INSUFFICIENCY: Status: ACTIVE | Noted: 2018-03-21

## 2018-03-21 LAB
ANION GAP SERPL CALCULATED.3IONS-SCNC: 9 MMOL/L (ref 4–13)
BASOPHILS # BLD AUTO: 0.05 THOUSANDS/ΜL (ref 0–0.1)
BASOPHILS NFR BLD AUTO: 1 % (ref 0–1)
BUN SERPL-MCNC: 15 MG/DL (ref 5–25)
CALCIUM SERPL-MCNC: 8.9 MG/DL (ref 8.3–10.1)
CHLORIDE SERPL-SCNC: 105 MMOL/L (ref 100–108)
CO2 SERPL-SCNC: 27 MMOL/L (ref 21–32)
CREAT SERPL-MCNC: 0.98 MG/DL (ref 0.6–1.3)
EOSINOPHIL # BLD AUTO: 0.19 THOUSAND/ΜL (ref 0–0.61)
EOSINOPHIL NFR BLD AUTO: 2 % (ref 0–6)
ERYTHROCYTE [DISTWIDTH] IN BLOOD BY AUTOMATED COUNT: 13.9 % (ref 11.6–15.1)
GFR SERPL CREATININE-BSD FRML MDRD: 81 ML/MIN/1.73SQ M
GLUCOSE SERPL-MCNC: 109 MG/DL (ref 65–140)
GLUCOSE SERPL-MCNC: 99 MG/DL (ref 65–140)
HCT VFR BLD AUTO: 46.7 % (ref 36.5–49.3)
HGB BLD-MCNC: 15.6 G/DL (ref 12–17)
LYMPHOCYTES # BLD AUTO: 1.97 THOUSANDS/ΜL (ref 0.6–4.47)
LYMPHOCYTES NFR BLD AUTO: 23 % (ref 14–44)
MAGNESIUM SERPL-MCNC: 2.1 MG/DL (ref 1.6–2.6)
MCH RBC QN AUTO: 32.3 PG (ref 26.8–34.3)
MCHC RBC AUTO-ENTMCNC: 33.4 G/DL (ref 31.4–37.4)
MCV RBC AUTO: 97 FL (ref 82–98)
MONOCYTES # BLD AUTO: 1.27 THOUSAND/ΜL (ref 0.17–1.22)
MONOCYTES NFR BLD AUTO: 15 % (ref 4–12)
NEUTROPHILS # BLD AUTO: 5.26 THOUSANDS/ΜL (ref 1.85–7.62)
NEUTS SEG NFR BLD AUTO: 60 % (ref 43–75)
NRBC BLD AUTO-RTO: 0 /100 WBCS
PHOSPHATE SERPL-MCNC: 2.7 MG/DL (ref 2.3–4.1)
PLATELET # BLD AUTO: 225 THOUSANDS/UL (ref 149–390)
PMV BLD AUTO: 10.4 FL (ref 8.9–12.7)
POTASSIUM SERPL-SCNC: 3.7 MMOL/L (ref 3.5–5.3)
RBC # BLD AUTO: 4.83 MILLION/UL (ref 3.88–5.62)
SODIUM SERPL-SCNC: 141 MMOL/L (ref 136–145)
WBC # BLD AUTO: 8.77 THOUSAND/UL (ref 4.31–10.16)

## 2018-03-21 PROCEDURE — 99232 SBSQ HOSP IP/OBS MODERATE 35: CPT | Performed by: INTERNAL MEDICINE

## 2018-03-21 PROCEDURE — 85025 COMPLETE CBC W/AUTO DIFF WBC: CPT | Performed by: PHYSICIAN ASSISTANT

## 2018-03-21 PROCEDURE — 83735 ASSAY OF MAGNESIUM: CPT | Performed by: PHYSICIAN ASSISTANT

## 2018-03-21 PROCEDURE — 80048 BASIC METABOLIC PNL TOTAL CA: CPT | Performed by: PHYSICIAN ASSISTANT

## 2018-03-21 PROCEDURE — 82948 REAGENT STRIP/BLOOD GLUCOSE: CPT

## 2018-03-21 PROCEDURE — 84100 ASSAY OF PHOSPHORUS: CPT | Performed by: PHYSICIAN ASSISTANT

## 2018-03-21 RX ADMIN — ACETAMINOPHEN 650 MG: 325 TABLET, FILM COATED ORAL at 04:55

## 2018-03-21 RX ADMIN — ACETAMINOPHEN 650 MG: 325 TABLET, FILM COATED ORAL at 00:42

## 2018-03-21 RX ADMIN — ACETAMINOPHEN 650 MG: 325 TABLET, FILM COATED ORAL at 21:55

## 2018-03-21 RX ADMIN — HEPARIN SODIUM 5000 UNITS: 5000 INJECTION, SOLUTION INTRAVENOUS; SUBCUTANEOUS at 13:22

## 2018-03-21 RX ADMIN — ASPIRIN 325 MG: 325 TABLET ORAL at 09:17

## 2018-03-21 RX ADMIN — HEPARIN SODIUM 5000 UNITS: 5000 INJECTION, SOLUTION INTRAVENOUS; SUBCUTANEOUS at 05:49

## 2018-03-21 RX ADMIN — HEPARIN SODIUM 5000 UNITS: 5000 INJECTION, SOLUTION INTRAVENOUS; SUBCUTANEOUS at 21:56

## 2018-03-21 RX ADMIN — HYDRALAZINE HYDROCHLORIDE 5 MG: 20 INJECTION INTRAMUSCULAR; INTRAVENOUS at 04:01

## 2018-03-21 RX ADMIN — ACETAMINOPHEN 650 MG: 325 TABLET, FILM COATED ORAL at 13:21

## 2018-03-21 RX ADMIN — ACETAMINOPHEN 650 MG: 325 TABLET, FILM COATED ORAL at 17:43

## 2018-03-21 RX ADMIN — AMLODIPINE BESYLATE 5 MG: 5 TABLET ORAL at 07:18

## 2018-03-21 RX ADMIN — ATORVASTATIN CALCIUM 40 MG: 40 TABLET, FILM COATED ORAL at 17:43

## 2018-03-21 NOTE — ASSESSMENT & PLAN NOTE
Without acute exacerbation  Echocardiogram yesterday showing ejection fraction of 55-65%, no regional wall motion abnormalities, grade 1 diastolic dysfunction

## 2018-03-21 NOTE — PROGRESS NOTES
Progress Note - Tatiana Ta 1950, 74 y o  male MRN: [id number]    Unit/Bed#:  Encounter: [id number]    Primary Care Provider: No primary care provider on file  Date and time admitted to hospital: 3/19/2018 12:32 AM        * TIA (transient ischemic attack)   Assessment & Plan    Likely symptoms caused by over correction of blood pressure  MRI today without acute findings  Essential hypertension   Assessment & Plan    Patient with hypertensive emergency on arrival   Unfortunately over corrected and then symptomatic  Did require infusion to control blood pressure initially on admission, however was weaned off was starting on Norvasc  Would continue  P r n  hydralazine ordered for systolic blood pressure of greater than 200  Goal 180-200  Slowly adjust goal each day to avoid symptomatic relative hypotension  VASQUEZ (acute kidney injury) [facility])   Assessment & Plan    Caused by hypertensive urgency  Now downtrending  NSTEMI (non-ST elevated myocardial infarction) [facility])   Assessment & Plan    Troponin peak at 0 04  No regional wall motion abnormalities on echo  Likely type 2 from hypertension  Chronic diastolic CHF (congestive heart failure) ([facility])   Assessment & Plan    Without acute exacerbation  Echocardiogram yesterday showing ejection fraction of 55-65%, no regional wall motion abnormalities, grade 1 diastolic dysfunction  Respiratory insufficiency   Assessment & Plan    Overnight was hypoxia  This was in conjunction with snoring  Patient does not have formal Obstructive Sleep Apnea diagnosis however would recommend outpatient workup  VTE Pharmacologic Prophylaxis:   Pharmacologic: Heparin  Mechanical VTE Prophylaxis in Place: Yes    Patient Centered Rounds: I have performed bedside rounds with nursing staff today      Discussions with Specialists or Other Care Team Provider:  Will discuss case with Cardiology    Education and Discussions with Family / Patient:  Patient updated with all findings from yesterday  Will update patient today with any new findings as well as any questions from family  Time Spent for Care: 30 minutes  More than 50% of total time spent on counseling and coordination of care as described above  Current Length of Stay: 1 day(s)    Current Patient Status: Inpatient   Certification Statement: The patient will continue to require additional inpatient hospital stay due to Titration of antihypertensive medications  A KI  Discharge Plan:  Likely DC home in next 24 hours  PT/OT pending    Code Status: Level 1 - Full Code      Subjective:   Transferred to [facility]  Overnight noted to be hypoxic while sleeping  No formal diagnosis of sleep apnea, however  did improve with supplemental oxygen  Patient complaining of headache this morning  Complains of not being able to sleep overnight  Objective:     Vitals:   Temp (24hrs), Av 1 °F (36 7 °C), Min:97 6 °F (36 4 °C), Max:98 2 °F (36 8 °C)    HR:  [67-94] 77  Resp:  [13-40] 24  BP: (175-218)/() 218/101  SpO2:  [90 %-95 %] 95 %  Body mass index is 47 23 kg/m²  Input and Output Summary (last 24 hours): Intake/Output Summary (Last 24 hours) at 18 0501  Last data filed at 18 2101   Gross per 24 hour   Intake              900 ml   Output              950 ml   Net              -50 ml       Physical Exam:     Physical Exam   Constitutional: He is oriented to person, place, and time  He appears well-developed and well-nourished  No distress  HENT:   Head: Normocephalic and atraumatic  Mouth/Throat: Oropharynx is clear and moist and mucous membranes are normal    Eyes: Conjunctivae and EOM are normal  Pupils are equal, round, and reactive to light  Neck: Trachea normal and phonation normal  Neck supple  No JVD present  Cardiovascular: Regular rhythm and normal pulses  Exam reveals no gallop  No murmur heard    Pulmonary/Chest: Effort normal and breath sounds normal  He has no decreased breath sounds  Abdominal: Bowel sounds are normal  He exhibits no distension  There is no tenderness  obese   Genitourinary:   Genitourinary Comments: Deferred   Musculoskeletal: Normal range of motion  He exhibits no edema  Neurological: He is alert and oriented to person, place, and time  No cranial nerve deficit  Non-focal   Skin: Skin is warm and dry  Nursing note and vitals reviewed  Additional Data:     Labs:      Results from last 7 days  Lab Units 03/20/18  0627   WBC Thousand/uL 8 35   HEMOGLOBIN g/dL 15 0   HEMATOCRIT % 44 5   PLATELETS Thousands/uL 226   NEUTROS PCT % 70   LYMPHS PCT % 16   MONOS PCT % 13*   EOS PCT % 0       Results from last 7 days  Lab Units 03/20/18  0437   SODIUM mmol/L 140   POTASSIUM mmol/L 3 9   CHLORIDE mmol/L 103   CO2 mmol/L 28   BUN mg/dL 20   CREATININE mg/dL 1 14   CALCIUM mg/dL 8 9   TOTAL PROTEIN g/dL 6 5   BILIRUBIN TOTAL mg/dL 0 70   ALK PHOS U/L 52   ALT U/L 28   AST U/L 16   GLUCOSE RANDOM mg/dL 136       Results from last 7 days  Lab Units 03/19/18  0104   INR  0 92       * I Have Reviewed All Lab Data Listed Above    * Additional Pertinent Lab Tests Reviewed: Labs Pending At The Time Of This Note    Imaging:    Imaging Reports Reviewed Today Include:  Echo, MRI  Imaging Personally Reviewed by Myself Includes:  CT head    Recent Cultures (last 7 days):           Last 24 Hours Medication List:     Current Facility-Administered Medications:  acetaminophen 650 mg Oral Q4H PRN Martin Garcia PA-C   acetaminophen 650 mg Oral Q6H [address] Kelly Cox PA-C   amLODIPine 5 mg Oral Daily Kelly Cox PA-C   aspirin 325 mg Oral Daily Martin Garcia PA-C   atorvastatin 40 mg Oral QPM Martin Garcia PA-C   heparin (porcine) 5,000 Units Subcutaneous Replaced by [facility] Martin Garcia PA-C   hydrALAZINE 5 mg Intravenous Q6H PRN Craig Moyer PA-C   morphine injection 2 mg Intravenous Once Jeremy Sparrow PA-C nitroglycerin 0 4 mg Sublingual Q5 Min PRN Randy Ritchie PA-C   ondansetron 4 mg Intravenous Q6H PRN Tamra Painter PA-C        Today, Patient Was Seen By: Lieutenant Surendra PA-C    ** Please Note: Dragon 360 Dictation voice to text software may have been used in the creation of this document   **

## 2018-03-21 NOTE — ASSESSMENT & PLAN NOTE
Troponin peak at 0 04  No regional wall motion abnormalities on echo  Likely type 2 from hypertension

## 2018-03-21 NOTE — ASSESSMENT & PLAN NOTE
Patient with hypertensive emergency on arrival   Unfortunately over corrected and then symptomatic  Did require infusion to control blood pressure initially on admission, however was weaned off was starting on Norvasc  Would continue  P r n  hydralazine ordered for systolic blood pressure of greater than 200  Goal 180-200  Slowly adjust goal each day to avoid symptomatic relative hypotension

## 2018-03-21 NOTE — ASSESSMENT & PLAN NOTE
Overnight was hypoxia  This was in conjunction with snoring  Patient does not have formal Obstructive Sleep Apnea diagnosis however would recommend outpatient workup

## 2018-03-21 NOTE — PHYSICAL THERAPY NOTE
Physical Therapy Cancellation Note    Pt declined PT session at this time stating, "this is the first time i'm starting to feel good  I don't want to chance it yet"  Agreeable to PT 3/22/18  Will cont to follow       Morteza Collado, PT

## 2018-03-22 LAB — ERYTHROCYTE [SEDIMENTATION RATE] IN BLOOD: 5 MM/HOUR (ref 0–10)

## 2018-03-22 PROCEDURE — 97116 GAIT TRAINING THERAPY: CPT

## 2018-03-22 PROCEDURE — 86617 LYME DISEASE ANTIBODY: CPT | Performed by: PSYCHIATRY & NEUROLOGY

## 2018-03-22 PROCEDURE — 86618 LYME DISEASE ANTIBODY: CPT | Performed by: PSYCHIATRY & NEUROLOGY

## 2018-03-22 PROCEDURE — 85652 RBC SED RATE AUTOMATED: CPT | Performed by: PSYCHIATRY & NEUROLOGY

## 2018-03-22 PROCEDURE — 99232 SBSQ HOSP IP/OBS MODERATE 35: CPT | Performed by: INTERNAL MEDICINE

## 2018-03-22 PROCEDURE — 99232 SBSQ HOSP IP/OBS MODERATE 35: CPT | Performed by: GENERAL PRACTICE

## 2018-03-22 PROCEDURE — 97535 SELF CARE MNGMENT TRAINING: CPT

## 2018-03-22 PROCEDURE — G8989 SELF CARE D/C STATUS: HCPCS

## 2018-03-22 PROCEDURE — 99233 SBSQ HOSP IP/OBS HIGH 50: CPT | Performed by: PSYCHIATRY & NEUROLOGY

## 2018-03-22 RX ORDER — ACETAMINOPHEN 325 MG/1
650 TABLET ORAL EVERY 4 HOURS PRN
Status: DISCONTINUED | OUTPATIENT
Start: 2018-03-22 | End: 2018-03-24 | Stop reason: HOSPADM

## 2018-03-22 RX ORDER — AMLODIPINE BESYLATE 10 MG/1
10 TABLET ORAL DAILY
Status: DISCONTINUED | OUTPATIENT
Start: 2018-03-23 | End: 2018-03-24 | Stop reason: HOSPADM

## 2018-03-22 RX ORDER — BUTALBITAL, ACETAMINOPHEN AND CAFFEINE 50; 325; 40 MG/1; MG/1; MG/1
1 TABLET ORAL 2 TIMES DAILY PRN
Status: DISCONTINUED | OUTPATIENT
Start: 2018-03-22 | End: 2018-03-24 | Stop reason: HOSPADM

## 2018-03-22 RX ORDER — CARVEDILOL 12.5 MG/1
12.5 TABLET ORAL 2 TIMES DAILY WITH MEALS
Status: DISCONTINUED | OUTPATIENT
Start: 2018-03-22 | End: 2018-03-23

## 2018-03-22 RX ORDER — SERTRALINE HYDROCHLORIDE 25 MG/1
25 TABLET, FILM COATED ORAL DAILY
Status: DISCONTINUED | OUTPATIENT
Start: 2018-03-22 | End: 2018-03-24 | Stop reason: HOSPADM

## 2018-03-22 RX ORDER — TOPIRAMATE 25 MG/1
25 TABLET ORAL
Status: DISCONTINUED | OUTPATIENT
Start: 2018-03-22 | End: 2018-03-24 | Stop reason: HOSPADM

## 2018-03-22 RX ADMIN — TOPIRAMATE 25 MG: 25 TABLET, FILM COATED ORAL at 23:00

## 2018-03-22 RX ADMIN — AMLODIPINE BESYLATE 5 MG: 5 TABLET ORAL at 08:39

## 2018-03-22 RX ADMIN — CARVEDILOL 12.5 MG: 12.5 TABLET, FILM COATED ORAL at 18:43

## 2018-03-22 RX ADMIN — ASPIRIN 325 MG: 325 TABLET ORAL at 08:39

## 2018-03-22 RX ADMIN — ACETAMINOPHEN 650 MG: 325 TABLET, FILM COATED ORAL at 05:37

## 2018-03-22 RX ADMIN — ACETAMINOPHEN 650 MG: 325 TABLET, FILM COATED ORAL at 11:45

## 2018-03-22 RX ADMIN — ATORVASTATIN CALCIUM 40 MG: 40 TABLET, FILM COATED ORAL at 18:43

## 2018-03-22 RX ADMIN — ACETAMINOPHEN 650 MG: 325 TABLET, FILM COATED ORAL at 23:02

## 2018-03-22 RX ADMIN — HEPARIN SODIUM 5000 UNITS: 5000 INJECTION, SOLUTION INTRAVENOUS; SUBCUTANEOUS at 13:49

## 2018-03-22 RX ADMIN — BUTALBITAL, ACETAMINOPHEN AND CAFFEINE 1 TABLET: 50; 325; 40 TABLET ORAL at 13:57

## 2018-03-22 RX ADMIN — HEPARIN SODIUM 5000 UNITS: 5000 INJECTION, SOLUTION INTRAVENOUS; SUBCUTANEOUS at 23:00

## 2018-03-22 RX ADMIN — HEPARIN SODIUM 5000 UNITS: 5000 INJECTION, SOLUTION INTRAVENOUS; SUBCUTANEOUS at 05:37

## 2018-03-22 RX ADMIN — SERTRALINE HYDROCHLORIDE 25 MG: 25 TABLET ORAL at 11:45

## 2018-03-22 RX ADMIN — ACETAMINOPHEN 650 MG: 325 TABLET, FILM COATED ORAL at 00:17

## 2018-03-22 NOTE — PROGRESS NOTES
[facility] Progress Note  Patient: Kyler Winters 74 y o  male   MRN: [id number]  PCP: No primary care provider on file  Unit/Bed#: -01 Encounter: [id number]  Date Of Visit: 03/22/18    Assessment:    Principal Problem:    TIA (transient ischemic attack)  Active Problems:    VASQUEZ (acute kidney injury) ([place] [address] )    Essential hypertension    Respiratory insufficiency    Chronic diastolic CHF (congestive heart failure) ([facility])    NSTEMI (non-ST elevated myocardial infarction) ([place] [address] )      Plan:    *Migraine with hemisensory dysfunction vs tia   Assessment & Plan     Likely symptoms caused by over correction of blood pressure  MRI brain without acute findings  Neurology consult appreciated  Consider migraine prophylaxis; esr, lilly and lyme titier          Essential hypertension   Assessment & Plan     Was on ntg gtt; now on norvasc and coreg added by cardiology          VASQUEZ (acute kidney injury) ([place] [address] )   Assessment & Plan     Caused by hypertensive urgency  Now downtrending           NSTEMI (non-ST elevated myocardial infarction) [facility])   Assessment & Plan     No regional wall motion abnormalities on echo  Likely type 2 from hypertension  Cardiology following  On coreg          Chronic diastolic CHF (congestive heart failure) ([place] [address] )   Assessment & Plan     Without acute exacerbation  Echocardiogram yesterday showing ejection fraction of 55-65%, no regional wall motion abnormalities, grade 1 diastolic dysfunction           Respiratory insufficiency   Assessment & Plan     liklely with sleep apnea will need outpatient sleep study              likely will need STR per PMR consult        VTE Pharmacologic Prophylaxis:   Pharmacologic: Heparin  Mechanical VTE Prophylaxis in Place: Yes    Patient Centered Rounds: I have performed bedside rounds with nursing staff today      Discussions with Specialists or Other Care Team Provider:     Education and Discussions with Family / Patient:     Time Spent for Care: 30 minutes  More than 50% of total time spent on counseling and coordination of care as described above  Current Length of Stay: 2 day(s)    Current Patient Status: Inpatient   Certification Statement: The patient will continue to require additional inpatient hospital stay due to HTN    Discharge Plan: PT/OT consults    Code Status: Level 1 - Full Code      Subjective:   C/o heart feels like pounding in chest     Objective:     Vitals:   Temp (24hrs), Av 9 °F (36 6 °C), Min:97 5 °F (36 4 °C), Max:98 3 °F (36 8 °C)    HR:  [74-99] 99  Resp:  [18-20] 18  BP: (143-185)/(76-83) 143/78  SpO2:  [91 %-95 %] 91 %  Body mass index is 47 23 kg/m²  Input and Output Summary (last 24 hours):     No intake or output data in the 24 hours ending 18 0803    Physical Exam:     Physical Exam   Constitutional: He appears well-developed and well-nourished  HENT:   Head: Normocephalic and atraumatic  Eyes: Pupils are equal, round, and reactive to light  Cardiovascular: Normal rate and regular rhythm  Pulmonary/Chest: Effort normal and breath sounds normal    Abdominal: Soft  Bowel sounds are normal    Musculoskeletal: He exhibits edema  Additional Data:     Labs:      Results from last 7 days  Lab Units 18  0503   WBC Thousand/uL 8 77   HEMOGLOBIN g/dL 15 6   HEMATOCRIT % 46 7   PLATELETS Thousands/uL 225   NEUTROS PCT % 60   LYMPHS PCT % 23   MONOS PCT % 15*   EOS PCT % 2       Results from last 7 days  Lab Units 18  0502 18  0437   SODIUM mmol/L 141 140   POTASSIUM mmol/L 3 7 3 9   CHLORIDE mmol/L 105 103   CO2 mmol/L 27 28   BUN mg/dL 15 20   CREATININE mg/dL 0 98 1 14   CALCIUM mg/dL 8 9 8 9   TOTAL PROTEIN g/dL  --  6 5   BILIRUBIN TOTAL mg/dL  --  0 70   ALK PHOS U/L  --  52   ALT U/L  --  28   AST U/L  --  16   GLUCOSE RANDOM mg/dL 109 136       Results from last 7 days  Lab Units 18  0104   INR  0 92       * I Have Reviewed All Lab Data Listed Above    * Additional Pertinent Lab Tests Reviewed: All Labs Within Last 24 Hours Reviewed    Imaging:    Imaging Reports Reviewed Today Include:   Imaging Personally Reviewed by Myself Includes:      Recent Cultures (last 7 days):           Last 24 Hours Medication List:     Current Facility-Administered Medications:  acetaminophen 650 mg Oral Q4H PRN Reginold Staff, PASTERLING   acetaminophen 650 mg Oral Q6H [address] Cory Kasper, CHRISTOPHER   amLODIPine 5 mg Oral Daily Cory Kasper, CHRISTOPHER   aspirin 325 mg Oral Daily Reginold Staff, PASTERLING   atorvastatin 40 mg Oral QPM Reginold Staff, CHRISTOPHER   heparin (porcine) 5,000 Units Subcutaneous [facility] Reginold Staff, PASTERLING   [facility] Hold] hydrALAZINE 5 mg Intravenous Q6H PRN Klaus Camacho PA-C   nitroglycerin 0 4 mg Sublingual Q5 Min PRN Nena Good, CHRISTOPHER   ondansetron 4 mg Intravenous Q6H PRN Reginold Staff, PASTERLING        Today, Patient Was Seen By: Xin Ashton MD    ** Please Note: Dragon 360 Dictation voice to text software may have been used in the creation of this document   **

## 2018-03-22 NOTE — PROGRESS NOTES
Progress Note - Neurology   Osvaldo Hsieh 74 y o  male MRN: [id number]  Unit/Bed#: -01 Encounter: [id number]      Subjective:   Patient seen at bedside, complains of pounding sensation in his chest, had a headache last night, claims he had a history of migraines when he was young, and has not had any recurrent symptoms of numbness affecting the left side of the face and the arm  Patient also had an MRI of the brain which was unremarkable  At baseline he has a history of essential hypertension, morbid obesity, and possible sleep apnea  Patient also admits to being depressed  ROS: 12 system cued query was unchanged from org  consult note  Vitals:   Vitals:    03/22/18 0700   BP: 143/78   Pulse: 99   Resp: 18   Temp: 98 1 °F (36 7 °C)   SpO2: 91%   ,Body mass index is 47 23 kg/m²      MEDS:    Current Facility-Administered Medications:     acetaminophen (TYLENOL) tablet 650 mg, 650 mg, Oral, Q4H PRN, Martin Garcia PA-C, 650 mg at 03/21/18 2155    acetaminophen (TYLENOL) tablet 650 mg, 650 mg, Oral, Q6H [address], SALINA Andersen-C, 650 mg at 03/22/18 0537    amLODIPine (NORVASC) tablet 5 mg, 5 mg, Oral, Daily, SALINA Andersen-ETHEL, 5 mg at 03/22/18 5242    aspirin tablet 325 mg, 325 mg, Oral, Daily, Martin Garcia PA-C, 325 mg at 03/22/18 5055    atorvastatin (LIPITOR) tablet 40 mg, 40 mg, Oral, QPM, Martin Garcia PA-C, 40 mg at 03/21/18 1743    heparin (porcine) subcutaneous injection 5,000 Units, 5,000 Units, Subcutaneous, Q8H [address], 5,000 Units at 03/22/18 0537 **AND** Platelet count, , , Once, Martin Garcia PA-C    [facility] Hold] hydrALAZINE (APRESOLINE) injection 5 mg, 5 mg, Intravenous, Q6H PRN, Craig Moyer PA-C, 5 mg at 03/21/18 0401    nitroglycerin (NITROSTAT) SL tablet 0 4 mg, 0 4 mg, Sublingual, Q5 Min PRN, Jeremy Sparrow PA-C    ondansetron [facility]) injection 4 mg, 4 mg, Intravenous, Q6H PRN, Martin Garcia PA-C    Physical Exam:  General appearance: alert, appears stated age and cooperative  Head: Normocephalic, without obvious abnormality, atraumatic    Neurologic:  His examination shows no evidence of any cranial nerve deficit, speech dysfunction, motor or sensory deficits in the upper lower extremities  Deep tendon reflexes are hypoactive, no dysmetria was noted  Patient also is morbidly obese and has a depressed affect  Lab Results: I have personally reviewed pertinent reports  Imaging Studies: I have personally reviewed pertinent films in PACS      Assessment:  1  Migraine headaches with robert sensory dysfunction versus TIA  History of hypertension  Mood disorder  Possible obstructive sleep apnea syndrome  Plan: Will consider starting the patient on migraine prophylaxis, Topamax 25 mg in the morning, Zoloft 25 mg in a m  and Fioricet on a p r n  basis  Sed rate FAYE Lyme titer will also be requested due to new onset of headaches at this age  Patient will need an outpatient sleep study and could be followed up with us as an outpatient  3/22/2018,10:53 AM    Dictation voice to text software has been used in the creation of this document  Please consider this in light of any contextual or grammatical errors

## 2018-03-22 NOTE — PLAN OF CARE
Problem: PHYSICAL THERAPY ADULT  Goal: Performs mobility at highest level of function for planned discharge setting  See evaluation for individualized goals  Treatment/Interventions: Functional transfer training, LE strengthening/ROM, Elevations, Therapeutic exercise, Endurance training, Patient/family training, Equipment eval/education, Bed mobility, Gait training, Spoke to nursing, OT  Equipment Recommended:  (TBD, anticipate none)       See flowsheet documentation for full assessment, interventions and recommendations  Outcome: Progressing  Prognosis: Good  Problem List: Decreased endurance, Impaired balance, Decreased mobility, Decreased safety awareness, Pain, Obesity  Assessment: Pt seen for PT treatment session this date with interventions consisting of gait training w/ emphasis on improving pt's ability to ambulate level surfaces x 200 ft with close S provided by therapist with no AD with mild lateral sway demonstrated  Refer to not for O2 saturation on room air and heart rate during session  Pt agreeable to PT treatment session upon arrival, pt found seated OOB in recliner, in no apparent distress  In comparison to previous session, pt with significant improvements in ambulation distance without use of an assistive device   Post session: pt returned back to recliner, all needs in reach and RN notified of session findings/recommendations Continue to recommend Home PT and home with family support 24/7 at time of d/c in order to maximize pt's functional independence and safety w/ mobility  Pt continues to be functioning below baseline level, and remains limited 2* factors listed above and including increased risk for falls  PT will continue to see pt while here in order to address the deficits listed above and provide interventions consistent w/ POC in effort to achieve STGs    Barriers to Discharge: None     Recommendation: Home PT, Home with family support     PT - OK to Discharge: No    See flowsheet documentation for full assessment

## 2018-03-22 NOTE — PROGRESS NOTES
General Cardiology   Progress Note   Laura Pacheco 74 y o  male MRN: [id number]  Unit/Bed#: -01 Encounter: [id number]        Subjective:   No significant events since the last encounter  Denies chest pain, SOB  BP improved since yesterday but still elevated  Objective:   Vitals:  Vitals:    18 0700   BP: 143/78   Pulse: 99   Resp: 18   Temp: 98 1 °F (36 7 °C)   SpO2: 91%       Body mass index is 47 23 kg/m²  Systolic (67KHB), UQ , Min:143 , OPB:813     Diastolic (01PFC), NHO:00, Min:78, Max:83      Intake/Output Summary (Last 24 hours) at 18 1140  Last data filed at 18 0906   Gross per 24 hour   Intake              240 ml   Output                0 ml   Net              240 ml     Weight (last 2 days)     Date/Time   Weight    18 0349  (!)  141 (310 63)              Telemetry Review: No significant arrhythmias seen on telemetry review  PHYSICAL EXAMS:  General:  Patient is not in acute distress, laying in the bed comfortably, awake, alert responding to commands  Head: Normocephalic, Atraumatic  HEENT: White sclera, pink conjunctiva,  PERRLA,pharynx benign  Neck:  Supple, no neck vein distention, carotids+2/+2 no bruits, thyromegaly, adenopathy  Respiratory: clear to P/A  Cardiovascular:  PMI normal, S1-S2 normal, No  Murmurs, thrills, gallops, rubs   Regular rhythm  GI:  Abdomen soft nontender   No hepatosplenomegaly, adenopathy, ascites,or rebound tenderness  Extremities: No edema, normal pulses, no calf tenderness, no joint deformities, no venous disease   Integument:  No skin rashes or ulceration  Lymphatic:  No cervical or inguinal lymphadenopathy  Neurologic:  Patient is awake alert, responding to command, well-oriented to time and place and person moving all extremities      LABORATORY RESULTS:    Results from last 7 days  Lab Units 18  0753 18  0437 18  0145   TROPONIN I ng/mL 0 03 0 03 0 03     CBC with diff:   Results from last 7 days  Lab Units 03/21/18  0503 03/20/18  0627 03/19/18  0627   WBC Thousand/uL 8 77 8 35 8 06   HEMOGLOBIN g/dL 15 6 15 0 16 2   HEMATOCRIT % 46 7 44 5 47 7   MCV fL 97 96 95   PLATELETS Thousands/uL 225 226 220  220   MCH pg 32 3 32 3 32 3   MCHC g/dL 33 4 33 7 34 0   RDW % 13 9 13 9 13 5   MPV fL 10 4 9 8 10 3  10 3   NRBC AUTO /100 WBCs 0 0  --        CMP:  Results from last 7 days  Lab Units 03/21/18  0502 03/20/18  0437 03/19/18  1546  03/19/18  0104   SODIUM mmol/L 141 140 137  < > 138   POTASSIUM mmol/L 3 7 3 9 4 5  < > 4 1   CHLORIDE mmol/L 105 103 100  < > 98*   CO2 mmol/L 27 28 26  < > 30   ANION GAP mmol/L 9 9 11  < > 10   BUN mg/dL 15 20 19  < > 23   CREATININE mg/dL 0 98 1 14 1 11  < > 1 41*   GLUCOSE RANDOM mg/dL 109 136 121  < > 136   CALCIUM mg/dL 8 9 8 9 10 0  < > 10 1   AST U/L  --  16  --   --  24   ALT U/L  --  28  --   --  35   ALK PHOS U/L  --  52  --   --  67   TOTAL PROTEIN g/dL  --  6 5  --   --  8 4*   BILIRUBIN TOTAL mg/dL  --  0 70  --   --  0 80   EGFR ml/min/1 73sq m 81 67 69  < > 52   < > = values in this interval not displayed      BMP:  Results from last 7 days  Lab Units 03/21/18  0502 03/20/18  0437 03/19/18  1546   SODIUM mmol/L 141 140 137   POTASSIUM mmol/L 3 7 3 9 4 5   CHLORIDE mmol/L 105 103 100   CO2 mmol/L 27 28 26   BUN mg/dL 15 20 19   CREATININE mg/dL 0 98 1 14 1 11   GLUCOSE RANDOM mg/dL 109 136 121   CALCIUM mg/dL 8 9 8 9 10 0                Results from last 7 days  Lab Units 03/21/18  0502   MAGNESIUM mg/dL 2 1       Results from last 7 days  Lab Units 03/19/18  0627   HEMOGLOBIN A1C % 6 0       Results from last 7 days  Lab Units 03/20/18  0437   TSH 3RD GENERATON uIU/mL 0 843   FREE T4 ng/dL 1 09       Results from last 7 days  Lab Units 03/19/18  0104   INR  0 92       Lipid Profile:   Lab Results   Component Value Date    CHOL 234 (H) 03/19/2018     Lab Results   Component Value Date    HDL 43 03/19/2018     Lab Results   Component Value Date    LDLCALC 176 (H) 03/19/2018 Lab Results   Component Value Date    TRIG 74 2018       Cardiac testing:   Results for orders placed during the hospital encounter of 18   Echo complete with contrast if indicated    Narrative [address] [place] [id number] [phone]    Transthoracic Echocardiogram  2D, M-mode, Doppler, and Color Doppler    Study date:  19-Mar-2018    Patient: Lesli Bird  MR number: [id number]  Account number: [de-identified]  : 1950  Age: 74 years  Gender: Male  Status: Outpatient  Location: Bedside  Height: 68 in  Weight: 321 lb  BP: 190/ 86 mmHg    Indications: Transient Ischemic Attack    Diagnoses: G45 9 - Transient cerebral ischemic attack, unspecified    Sonographer:  AUGUSTINE Bowen,RDCS  Interpreting Physician:  Surya Blackwell MD  Referring Physician:  Esvin Nevarez PA-C  Group:  [facility]    SUMMARY    LEFT VENTRICLE:  Systolic function was normal  Ejection fraction was estimated in the range of 55 % to 65 %  There were no regional wall motion abnormalities  Doppler parameters were consistent with abnormal left ventricular relaxation (grade 1 diastolic dysfunction)  LEFT ATRIUM:  The atrium was mildly dilated  RIGHT ATRIUM:  The atrium was mildly dilated  MITRAL VALVE:  There was mild regurgitation  TRICUSPID VALVE:  There was mild regurgitation  HISTORY: PRIOR HISTORY: Essential Hypertension, Transient Ischemic Attack, Acute Kidney Injury    PROCEDURE: The procedure was performed at the bedside  This was a routine study  The transthoracic approach was used  The study included complete 2D imaging, M-mode, complete spectral Doppler, and color Doppler  The heart rate was 74 bpm,  at the start of the study  Images were obtained from the parasternal, apical, subcostal, and suprasternal notch acoustic windows   Echocardiographic views were limited due to poor acoustic window availability, decreased penetration, and  lung interference  This was a technically difficult study  LEFT VENTRICLE: Size was normal  Systolic function was normal  Ejection fraction was estimated in the range of 55 % to 65 %  There were no regional wall motion abnormalities  Wall thickness was normal  DOPPLER: Doppler parameters were  consistent with abnormal left ventricular relaxation (grade 1 diastolic dysfunction)  RIGHT VENTRICLE: The size was normal  Systolic function was normal  Wall thickness was normal     LEFT ATRIUM: The atrium was mildly dilated  RIGHT ATRIUM: The atrium was mildly dilated  MITRAL VALVE: Valve structure was normal  There was normal leaflet separation  DOPPLER: The transmitral velocity was within the normal range  There was no evidence for stenosis  There was mild regurgitation  AORTIC VALVE: The valve was trileaflet  Leaflets exhibited normal thickness and normal cuspal separation  DOPPLER: Transaortic velocity was within the normal range  There was no evidence for stenosis  There was no regurgitation  TRICUSPID VALVE: The valve structure was normal  There was normal leaflet separation  DOPPLER: The transtricuspid velocity was within the normal range  There was no evidence for stenosis  There was mild regurgitation  PULMONIC VALVE: Leaflets exhibited normal thickness, no calcification, and normal cuspal separation  DOPPLER: The transpulmonic velocity was within the normal range  There was no regurgitation  PERICARDIUM: There was no pericardial effusion  The pericardium was normal in appearance  AORTA: The root exhibited normal size      SYSTEM MEASUREMENT TABLES    2D  EF Biplane: 53 %  LA Area: 25 1 cm2  LVEDV MOD A2C: 117 8 ml  LVEDV MOD A4C: 167 7 ml  LVEDV MOD BP: 141 7 ml  LVEF MOD A2C: 47 2 %  LVEF MOD A4C: 57 3 %  LVESV MOD A2C: 62 2 ml  LVESV MOD A4C: 71 5 ml  LVESV MOD BP: 66 6 ml  LVLd A2C: 8 8 cm  LVLd A4C: 9 1 cm  LVLs A2C: 7 7 cm  LVLs A4C: 7 8 cm  RA Area: 24 6 cm2  RVIDd: 4 1 cm  SV MOD A2C: 55 6 ml  SV MOD A4C: 96 1 ml    MM  TAPSE: 3 5 cm    PW  MV A Taye: 1 1 m/s  MV Dec [place]: 7 1 m/s2  MV DecT: 144 8 ms  MV E Taye: 1 m/s  MV E/A Ratio: 0 9  MV PHT: 42 ms  MVA By PHT: 5 2 cm2    [facility] Accredited Echocardiography Laboratory    Prepared and electronically signed by    Laisha Shane MD  Signed 20-Mar-2018 08:13:40         Meds/Allergies   all current active meds have been reviewed and current meds:   Current Facility-Administered Medications   Medication Dose Route Frequency    acetaminophen (TYLENOL) tablet 650 mg  650 mg Oral Q6H [facility]    acetaminophen (TYLENOL) tablet 650 mg  650 mg Oral Q4H PRN    [START ON 3/23/2018] amLODIPine (NORVASC) tablet 10 mg  10 mg Oral Daily    aspirin tablet 325 mg  325 mg Oral Daily    atorvastatin (LIPITOR) tablet 40 mg  40 mg Oral QPM    butalbital-acetaminophen-caffeine (FIORICET,ESGIC) -40 mg per tablet 1 tablet  1 tablet Oral BID PRN    carvedilol (COREG) tablet 12 5 mg  12 5 mg Oral BID With Meals    heparin (porcine) subcutaneous injection 5,000 Units  5,000 Units Subcutaneous Q8H [facility]    [MAR Hold] hydrALAZINE (APRESOLINE) injection 5 mg  5 mg Intravenous Q6H PRN    nitroglycerin (NITROSTAT) SL tablet 0 4 mg  0 4 mg Sublingual Q5 Min PRN    ondansetron (ZOFRAN) injection 4 mg  4 mg Intravenous Q6H PRN    sertraline (ZOLOFT) tablet 25 mg  25 mg Oral Daily    topiramate (TOPAMAX) tablet 25 mg  25 mg Oral HS     No prescriptions prior to admission  Assessment/Plan:  1  HTN urgency: BP improved but still elevated  Will start coreg 25 mg BID  Will increase norvasc to 10 mg     2  Chest pain:   -Serial troponins neg x 6  -Resolved, likely was secondary to elevated BP   Antihypertensive medication changes as stated above    -ECHO shows EF 55-65%, no regional wall motion abnormalities, grade 2 DD  -Will consider ischemic workup tomorrow   -Continue ASA, statin, BB    3  Stroke-like symptoms: Per neurology: migraine vs TIA  Continue ASA, statin  ** Please Note: Dragon 360 Dictation voice to text software may have been used in the creation of this document   **

## 2018-03-22 NOTE — PHYSICAL THERAPY NOTE
PHYSICAL THERAPY NOTE          Patient Name: James SAAB Date: 3/22/2018         03/22/18 0675   Pain Assessment   Pain Assessment 0-10   Pain Score 5   Pain Type Acute pain   Pain Location Head   Pain Orientation Frontal   Pain Descriptors Discomfort   Pain Frequency Constant/continuous   Patient's Stated Pain Goal No pain   Hospital Pain Intervention(s) Ambulation/increased activity;Repositioned  (OMAR Euceda aware of and addressing pain )   Restrictions/Precautions   Weight Bearing Precautions Per Order No   Braces or Orthoses Other (Comment)  (none as per chart)   Other Precautions Telemetry; Fall Risk;Fluid restriction   General   Chart Reviewed Yes   Response to Previous Treatment Patient with no complaints from previous session  Family/Caregiver Present No   Cognition   Overall Cognitive Status WFL   Arousal/Participation Alert; Responsive; Cooperative   Attention Within functional limits   Orientation Level Oriented X4   Memory Within functional limits   Following Commands Follows all commands and directions without difficulty   Comments Pt agreeable to PT session  Bed Mobility   Additional Comments Patient received OOB in recliner chair upon arrival for PT session  Transfers   Sit to Stand 5  Supervision   Stand to Sit 5  Supervision   Additional Comments Vital signs: O2 saturation on room air and heart rate at rest: 97%, 112 beats per minute; during ambulation: O2 saturation: 95%, heart rate: 120 beats per minute; at completion of ambulation upon pt sitting back in recliner, heart rate increased to 144 beats per minute; patient's heart rate decreased to 120 beats per minute within 15 seconds of seated rest and recovered to 104 beats per minute ~3-5 minutes post-ambulation, with O2 saturation: 95-97%; OMAR Flores notified of such  Ambulation/Elevation   Gait pattern Short stride; Wide STEPHEN  (mild lateral sway ) Gait Assistance 5  Supervision   Additional items Assist x 1;Verbal cues   Assistive Device None   Distance 200 feet    Stair Management Assistance Not tested   Balance   Static Sitting Normal   Dynamic Sitting Normal   Static Standing Fair +   Dynamic Standing Fair   Ambulatory Fair   Endurance Deficit   Endurance Deficit Yes   Activity Tolerance   Activity Tolerance Patient limited by fatigue;Patient limited by pain   Medical Staff Made Aware Yes, OMAR Jean Baptiste notified of patient's O2 saturation and heart rate during PT session; made aware of therapy outcome   Nurse Made Aware Yes, OMAR Jean Baptiste verbalized patient appropriate for PT session  Assessment   Prognosis Good   Problem List Decreased endurance; Impaired balance;Decreased mobility; Decreased safety awareness;Pain;Obesity   Assessment Pt seen for PT treatment session this date with interventions consisting of gait training w/ emphasis on improving pt's ability to ambulate level surfaces x 200 ft with close S provided by therapist with no AD with mild lateral sway demonstrated  Refer to not for O2 saturation on room air and heart rate during session  Pt agreeable to PT treatment session upon arrival, pt found seated OOB in recliner, in no apparent distress  In comparison to previous session, pt with significant improvements in ambulation distance without use of an assistive device   Post session: pt returned back to recliner, all needs in reach and RN notified of session findings/recommendations Continue to recommend Home PT and home with family support 24/7 at time of d/c in order to maximize pt's functional independence and safety w/ mobility  Pt continues to be functioning below baseline level, and remains limited 2* factors listed above and including increased risk for falls  PT will continue to see pt while here in order to address the deficits listed above and provide interventions consistent w/ POC in effort to achieve STGs     Barriers to Discharge None Goals   Patient Goals none expressed by patient    STG Expiration Date 03/29/18   Short Term Goal #1 STGs remain approrpriate    Treatment Day 1   Plan   Treatment/Interventions Functional transfer training;LE strengthening/ROM; Therapeutic exercise; Endurance training;Patient/family training;Equipment eval/education; Bed mobility;Gait training;Elevations; Spoke to nursing   Progress Progressing toward goals   PT Frequency 5x/wk   Recommendation   Recommendation Home PT; Home with family support   Equipment Recommended Other (Comment)  (none at this time )   PT - OK to Discharge No   Additional Comments PT to further assess consistency with level surface ambulation and ability to clear stairs prior to discharge    Yariel Richardson, PT

## 2018-03-22 NOTE — PLAN OF CARE
Problem: OCCUPATIONAL THERAPY ADULT  Goal: Performs self-care activities at highest level of function for planned discharge setting  See evaluation for individualized goals  Treatment Interventions: ADL retraining, Functional transfer training, UE strengthening/ROM, Endurance training, Energy conservation, Compensatory technique education, Neuromuscular reeducation, Patient/family training          See flowsheet documentation for full assessment, interventions and recommendations  Outcome: Completed Date Met: 03/22/18  Limitation: Decreased high-level ADLs, Decreased self-care trans  Prognosis: Good  Assessment: Pt seen at bedside for OT tx session  On this date, pt presents to OT at an overall (I) level for basic self cares and functional mobility  Pt notes only problem at this time to be headache  Pt without LOB or s/s of fatigue during session and was able to safely carry out all tasks on this date  Pt's primary concern is medication management at this time and feels as though will be ok to care for self once d/c'd to home  After seeing pt today and speaking with pt re: current status, pt agrees no further skilled OT needs warranted at this time  pt educated to alert staff of any decline in overall performance occurs  Will remove from OT caseload  Thank you        OT Discharge Recommendation: Home with family support  OT - OK to Discharge: Yes (once medically cleared)

## 2018-03-22 NOTE — OCCUPATIONAL THERAPY NOTE
Occupational Therapy Treatment Note      Mannie Mckinnon    3/22/2018    Patient Active Problem List   Diagnosis    TIA (transient ischemic attack)    VASQUEZ (acute kidney injury) ([facility] [address] )    Essential hypertension    Respiratory insufficiency    Chronic diastolic CHF (congestive heart failure) ([facility])    NSTEMI (non-ST elevated myocardial infarction) [facility])       Past Medical History:   Diagnosis Date    Hyperlipidemia        History reviewed  No pertinent surgical history  03/22/18 1500   Restrictions/Precautions   Other Precautions Telemetry; Fall Risk   General   Family/Caregiver Present pt's daughter present in room t/o session   Lifestyle   Reciprocal Relationships supportive daughter and spouse per pt   Pain Assessment   Pain Assessment 0-10   Pain Score No Pain   ADL   Grooming Assistance 7  Independent   UB Bathing Assistance 7  Independent   UB Bathing Comments simulated   UB Dressing Assistance 7  Independent   LB Dressing Comments pt reports "Oh I am fine with that "    Toileting Assistance  7  Independent   Toileting Comments pt reports has been taking self to and from restroom   Functional Standing Tolerance   Time 6mins while completing sinkside G&H and then to adjust gown which was donned backward   Transfers   Sit to Stand 7  Independent   Stand to Sit 7  Independent   Additional Comments no AD used   Functional Mobility   Functional Mobility 7  Independent   Additional Comments pt reports has been taking self to/from restroom (I)ly  No LOB during session and no s/s SOB or fatigue  Also, no reports of dizziness  Toilet Transfers   Toilet Transfer to [employer] Transfers Comments no grab bar used   Cognition   Overall Cognitive Status WFL   Arousal/Participation Alert; Cooperative   Attention Within functional limits   Orientation Level Oriented X4   Memory Within functional limits   Following Commands Follows all commands and directions without difficulty   Additional Activities   Additional Activities Comments pt education given on safety during hospital course, appropriate activity level  Pt verbalized understanding   Activity Tolerance   Activity Tolerance Patient tolerated treatment well   Assessment   Assessment Pt seen at bedside for OT tx session  On this date, pt presents to OT at an overall (I) level for basic self cares and functional mobility  Pt notes only problem at this time to be headache  Pt without LOB or s/s of fatigue during session and was able to safely carry out all tasks on this date  Pt's primary concern is medication management at this time and feels as though will be ok to care for self once d/c'd to home  After seeing pt today and speaking with pt re: current status, pt agrees no further skilled OT needs warranted at this time  pt educated to alert staff of any decline in overall performance occurs  Will remove from OT caseload  Thank you      Plan   Treatment Day 1   Recommendation   OT Discharge Recommendation Home with family support   OT - OK to Discharge Yes  (once medically cleared)   Barthel Index   Feeding 10   Bathing 5   Grooming Score 5   Dressing Score 10   Bladder Score 10   Bowels Score 10   Toilet Use Score 10   Transfers (Bed/Chair) Score 10   Mobility (Level Surface) Score 10   Stairs Score 0   Barthel Index Score 80     Erika Medina, OT

## 2018-03-23 LAB
B BURGDOR IGG SER IA-ACNC: 0.85
B BURGDOR IGM SER IA-ACNC: 0.68

## 2018-03-23 PROCEDURE — 99232 SBSQ HOSP IP/OBS MODERATE 35: CPT | Performed by: INTERNAL MEDICINE

## 2018-03-23 PROCEDURE — 99232 SBSQ HOSP IP/OBS MODERATE 35: CPT | Performed by: GENERAL PRACTICE

## 2018-03-23 PROCEDURE — 86038 ANTINUCLEAR ANTIBODIES: CPT | Performed by: PSYCHIATRY & NEUROLOGY

## 2018-03-23 RX ORDER — CARVEDILOL 12.5 MG/1
25 TABLET ORAL 2 TIMES DAILY WITH MEALS
Status: DISCONTINUED | OUTPATIENT
Start: 2018-03-23 | End: 2018-03-24 | Stop reason: HOSPADM

## 2018-03-23 RX ADMIN — ATORVASTATIN CALCIUM 40 MG: 40 TABLET, FILM COATED ORAL at 17:05

## 2018-03-23 RX ADMIN — HEPARIN SODIUM 5000 UNITS: 5000 INJECTION, SOLUTION INTRAVENOUS; SUBCUTANEOUS at 06:15

## 2018-03-23 RX ADMIN — AMLODIPINE BESYLATE 10 MG: 10 TABLET ORAL at 09:19

## 2018-03-23 RX ADMIN — ACETAMINOPHEN 650 MG: 325 TABLET, FILM COATED ORAL at 06:14

## 2018-03-23 RX ADMIN — HEPARIN SODIUM 5000 UNITS: 5000 INJECTION, SOLUTION INTRAVENOUS; SUBCUTANEOUS at 21:32

## 2018-03-23 RX ADMIN — CARVEDILOL 25 MG: 12.5 TABLET, FILM COATED ORAL at 17:04

## 2018-03-23 RX ADMIN — CARVEDILOL 12.5 MG: 12.5 TABLET, FILM COATED ORAL at 09:19

## 2018-03-23 RX ADMIN — ASPIRIN 325 MG: 325 TABLET ORAL at 09:19

## 2018-03-23 RX ADMIN — TOPIRAMATE 25 MG: 25 TABLET, FILM COATED ORAL at 21:32

## 2018-03-23 RX ADMIN — HEPARIN SODIUM 5000 UNITS: 5000 INJECTION, SOLUTION INTRAVENOUS; SUBCUTANEOUS at 14:32

## 2018-03-23 RX ADMIN — BUTALBITAL, ACETAMINOPHEN AND CAFFEINE 1 TABLET: 50; 325; 40 TABLET ORAL at 17:05

## 2018-03-23 RX ADMIN — BUTALBITAL, ACETAMINOPHEN AND CAFFEINE 1 TABLET: 50; 325; 40 TABLET ORAL at 01:44

## 2018-03-23 RX ADMIN — ACETAMINOPHEN 650 MG: 325 TABLET, FILM COATED ORAL at 11:47

## 2018-03-23 RX ADMIN — SERTRALINE HYDROCHLORIDE 25 MG: 25 TABLET ORAL at 09:18

## 2018-03-23 NOTE — SOCIAL WORK
Cm met with patient at bedside to review therapy recommendations  Patient declining home pt at this time, reports he feels confident he is able to manage his care independently at home  Patient denies the need for any dme at this time  Patient encouraged to reach out to case management team if he changes his mind regarding home pt or dme need

## 2018-03-23 NOTE — PROGRESS NOTES
[facility] Internal Medicine Progress Note  Patient: Chris Moscoso 74 y o  male   MRN: [id number]  PCP: No primary care provider on file  Unit/Bed#: -01 Encounter: [id number]  Date Of Visit: 03/23/18    Assessment:    Principal Problem:    TIA (transient ischemic attack)  Active Problems:    VASQUEZ (acute kidney injury) ([facility] )    Essential hypertension    Respiratory insufficiency    Chronic diastolic CHF (congestive heart failure) ([facility])    NSTEMI (non-ST elevated myocardial infarction) ([facility] )      Plan:    Needs thyroid us as outpatient upon discharge for left thyroid lobe nodule    Migraine with hemisensory dysfunction vs tia   Assessment & Plan      MRI brain without acute findings  cta reviewed  Neurology consult appreciated  Consider migraine prophylaxis; esr-5, lilly and lyme titier-igg 0 85; lyme western blot pending          Essential hypertension   Assessment & Plan     Was on ntg gtt; now on norvasc and coreg with adjust meds with elevation          VASQUEZ (acute kidney injury) ([place] [address] )   Assessment & Plan     Resolved           NSTEMI (non-ST elevated myocardial infarction) ([place] [address] )   Assessment & Plan     No regional wall motion abnormalities on echo   Likely type 2 from hypertension  Cardiology following  On coreg          Chronic diastolic CHF (congestive heart failure) ([place] [address] )   Assessment & Plan     Without acute exacerbation   Echocardiogram yesterday showing ejection fraction of 55-65%, no regional wall motion abnormalities, grade 1 diastolic dysfunction           Respiratory insufficiency   Assessment & Plan     liklely with sleep apnea will need outpatient sleep study                            VTE Pharmacologic Prophylaxis:   Pharmacologic: Heparin  Mechanical VTE Prophylaxis in Place: Yes    Patient Centered Rounds: I have performed bedside rounds with nursing staff today      Discussions with Specialists or Other Care Team Provider:     Education and Discussions with Family / Patient:     Time Spent for Care: 30 minutes  More than 50% of total time spent on counseling and coordination of care as described above  Current Length of Stay: 3 day(s)    Current Patient Status: Inpatient   Certification Statement: The patient will continue to require additional inpatient hospital stay due to htn    Discharge Plan: STR    Code Status: Level 1 - Full Code      Subjective:   Chest pain improved    Objective:     Vitals:   Temp (24hrs), Av 4 °F (36 9 °C), Min:98 3 °F (36 8 °C), Max:98 5 °F (36 9 °C)    HR:  [81-97] 86  Resp:  [18] 18  BP: (142-187)/(65-86) 187/86  SpO2:  [93 %-95 %] 93 %  Body mass index is 47 23 kg/m²  Input and Output Summary (last 24 hours): Intake/Output Summary (Last 24 hours) at 18 0955  Last data filed at 18 1500   Gross per 24 hour   Intake              714 ml   Output                0 ml   Net              714 ml       Physical Exam:     Physical Exam   Constitutional: He appears well-developed and well-nourished  HENT:   Head: Normocephalic and atraumatic  Eyes: Pupils are equal, round, and reactive to light  Cardiovascular: Normal rate and regular rhythm  Pulmonary/Chest: Effort normal and breath sounds normal    Abdominal: Soft  Musculoskeletal: He exhibits edema     Stasis dermatitis       Additional Data:     Labs:      Results from last 7 days  Lab Units 18  0503   WBC Thousand/uL 8 77   HEMOGLOBIN g/dL 15 6   HEMATOCRIT % 46 7   PLATELETS Thousands/uL 225   NEUTROS PCT % 60   LYMPHS PCT % 23   MONOS PCT % 15*   EOS PCT % 2       Results from last 7 days  Lab Units 18  0502 18  0437   SODIUM mmol/L 141 140   POTASSIUM mmol/L 3 7 3 9   CHLORIDE mmol/L 105 103   CO2 mmol/L 27 28   BUN mg/dL 15 20   CREATININE mg/dL 0 98 1 14   CALCIUM mg/dL 8 9 8 9   TOTAL PROTEIN g/dL  --  6 5   BILIRUBIN TOTAL mg/dL  --  0 70   ALK PHOS U/L  --  52   ALT U/L  --  28   AST U/L  --  16   GLUCOSE RANDOM mg/dL 109 136       Results from last 7 days  Lab Units 03/19/18  0104   INR  0 92       * I Have Reviewed All Lab Data Listed Above  * Additional Pertinent Lab Tests Reviewed: All Labs Within Last 24 Hours Reviewed    Imaging:    Imaging Reports Reviewed Today Include:   Imaging Personally Reviewed by Myself Includes:      Recent Cultures (last 7 days):           Last 24 Hours Medication List:     Current Facility-Administered Medications:  acetaminophen 650 mg Oral Q6H [facility] Abnergraciela Nichols PA-C   acetaminophen 650 mg Oral Q4H PRN Tea Mejia MD   amLODIPine 10 mg Oral Daily Alta Elias PA-C   aspirin 325 mg Oral Daily Dave Dasilva PA-C   atorvastatin 40 mg Oral QPM Dave Dasilva PA-C   butalbital-acetaminophen-caffeine 1 tablet Oral BID PRN Tea Mejia MD   carvedilol 12 5 mg Oral BID With Meals Alta Elias PA-C   heparin (porcine) 5,000 Units Subcutaneous [facility] Dave Dasilva PA-C   [facility] Hold] hydrALAZINE 5 mg Intravenous Q6H PRN Chula Merino PA-C   nitroglycerin 0 4 mg Sublingual Q5 Min PRN Sim Hanna PA-C   ondansetron 4 mg Intravenous Q6H PRN Dave Dasilva PA-C   sertraline 25 mg Oral Daily Tea Mejia MD   topiramate 25 mg Oral HS Tea Mejia MD        Today, Patient Was Seen By: Manjinder aC MD    ** Please Note: Dragon 360 Dictation voice to text software may have been used in the creation of this document   **

## 2018-03-23 NOTE — PLAN OF CARE
Problem: DISCHARGE PLANNING - CARE MANAGEMENT  Goal: Discharge to post-acute care or home with appropriate resources  INTERVENTIONS:  - Conduct assessment to determine patient/family and health care team treatment goals, and need for post-acute services based on payer coverage, community resources, and patient preferences, and barriers to discharge  - Address psychosocial, clinical, and financial barriers to discharge as identified in assessment in conjunction with the patient/family and health care team  - Arrange appropriate level of post-acute services according to patients   needs and preference and payer coverage in collaboration with the physician and health care team  - Communicate with and update the patient/family, physician, and health care team regarding progress on the discharge plan  - Arrange appropriate transportation to post-acute venues   Outcome: Progressing  Declining vna at this time  Cm team will continue to follow and assess

## 2018-03-23 NOTE — PROGRESS NOTES
General Cardiology   Progress Note   Kings Mention 74 y o  male MRN: [id number]  Unit/Bed#: -01 Encounter: [id number]        Subjective:   Blood pressure significantly improved since starting Coreg  Her blood pressure is still elevated    Objective:   Vitals:  Vitals:    03/23/18 1704   BP: (!) 180/86   Pulse:    Resp:    Temp:    SpO2:        Body mass index is 47 23 kg/m²  Systolic (11UMO), QCX:898 , Min:142 , IHE:216     Diastolic (96QKX), CCP:79, Min:65, Max:86    No intake or output data in the 24 hours ending 03/23/18 1826  Weight (last 2 days)     Date/Time   Weight    03/21/18 0349  (!)  141 (310 63)              Telemetry Review: No significant arrhythmias seen on telemetry review  PHYSICAL EXAMS:  General:  Patient is not in acute distress, laying in the bed comfortably, awake, alert responding to commands  Head: Normocephalic, Atraumatic  HEENT: White sclera, pink conjunctiva,  PERRLA,pharynx benign  Neck:  Supple, no neck vein distention, carotids+2/+2 no bruits, thyromegaly, adenopathy  Respiratory: clear to P/A  Cardiovascular:  PMI normal, S1-S2 normal, No  Murmurs, thrills, gallops, rubs   Regular rhythm  GI:  Abdomen soft nontender   No hepatosplenomegaly, adenopathy, ascites,or rebound tenderness  Extremities: No edema, normal pulses, no calf tenderness, no joint deformities, no venous disease   Integument:  No skin rashes or ulceration  Lymphatic:  No cervical or inguinal lymphadenopathy  Neurologic:  Patient is awake alert, responding to command, well-oriented to time and place and person moving all extremities      LABORATORY RESULTS:    Results from last 7 days  Lab Units 03/20/18  0753 03/20/18  0437 03/20/18  0145   TROPONIN I ng/mL 0 03 0 03 0 03     CBC with diff:   Results from last 7 days  Lab Units 03/21/18  0503 03/20/18  0627 03/19/18  0627   WBC Thousand/uL 8 77 8 35 8 06   HEMOGLOBIN g/dL 15 6 15 0 16 2   HEMATOCRIT % 46 7 44 5 47 7   MCV fL 97 96 95   PLATELETS Thousands/uL 225 226 220  220   MCH pg 32 3 32 3 32 3   MCHC g/dL 33 4 33 7 34 0   RDW % 13 9 13 9 13 5   MPV fL 10 4 9 8 10 3  10 3   NRBC AUTO /100 WBCs 0 0  --        CMP:  Results from last 7 days  Lab Units 03/21/18  0502 03/20/18  0437 03/19/18  1546  03/19/18  0104   SODIUM mmol/L 141 140 137  < > 138   POTASSIUM mmol/L 3 7 3 9 4 5  < > 4 1   CHLORIDE mmol/L 105 103 100  < > 98*   CO2 mmol/L 27 28 26  < > 30   ANION GAP mmol/L 9 9 11  < > 10   BUN mg/dL 15 20 19  < > 23   CREATININE mg/dL 0 98 1 14 1 11  < > 1 41*   GLUCOSE RANDOM mg/dL 109 136 121  < > 136   CALCIUM mg/dL 8 9 8 9 10 0  < > 10 1   AST U/L  --  16  --   --  24   ALT U/L  --  28  --   --  35   ALK PHOS U/L  --  52  --   --  67   TOTAL PROTEIN g/dL  --  6 5  --   --  8 4*   BILIRUBIN TOTAL mg/dL  --  0 70  --   --  0 80   EGFR ml/min/1 73sq m 81 67 69  < > 52   < > = values in this interval not displayed      BMP:  Results from last 7 days  Lab Units 03/21/18  0502 03/20/18  0437 03/19/18  1546   SODIUM mmol/L 141 140 137   POTASSIUM mmol/L 3 7 3 9 4 5   CHLORIDE mmol/L 105 103 100   CO2 mmol/L 27 28 26   BUN mg/dL 15 20 19   CREATININE mg/dL 0 98 1 14 1 11   GLUCOSE RANDOM mg/dL 109 136 121   CALCIUM mg/dL 8 9 8 9 10 0              Results from last 7 days  Lab Units 03/21/18  0502   MAGNESIUM mg/dL 2 1       Results from last 7 days  Lab Units 03/19/18  0627   HEMOGLOBIN A1C % 6 0       Results from last 7 days  Lab Units 03/20/18  0437   TSH 3RD GENERATON uIU/mL 0 843   FREE T4 ng/dL 1 09       Results from last 7 days  Lab Units 03/19/18  0104   INR  0 92       Lipid Profile:   Lab Results   Component Value Date    CHOL 234 (H) 03/19/2018     Lab Results   Component Value Date    HDL 43 03/19/2018     Lab Results   Component Value Date    LDLCALC 176 (H) 03/19/2018     Lab Results   Component Value Date    TRIG 74 03/19/2018       Cardiac testing:   Results for orders placed during the hospital encounter of 03/19/18   Echo complete with contrast if indicated    Narrative [address] [place] [id number]  [phone]    Transthoracic Echocardiogram  2D, M-mode, Doppler, and Color Doppler    Study date:  19-Mar-2018    Patient: Mina Riggs  MR number: [id number]  Account number: [de-identified]  : 1950  Age: 74 years  Gender: Male  Status: Outpatient  Location: Bedside  Height: 68 in  Weight: 321 lb  BP: 190/ 86 mmHg    Indications: Transient Ischemic Attack    Diagnoses: G45 9 - Transient cerebral ischemic attack, unspecified    Sonographer:  AUGUSTINE Lentz,RDCS  Interpreting Physician:  Zaira Ramsey MD  Referring Physician:  Martin Garcia PA-C  Group:  [facility]    SUMMARY    LEFT VENTRICLE:  Systolic function was normal  Ejection fraction was estimated in the range of 55 % to 65 %  There were no regional wall motion abnormalities  Doppler parameters were consistent with abnormal left ventricular relaxation (grade 1 diastolic dysfunction)  LEFT ATRIUM:  The atrium was mildly dilated  RIGHT ATRIUM:  The atrium was mildly dilated  MITRAL VALVE:  There was mild regurgitation  TRICUSPID VALVE:  There was mild regurgitation  HISTORY: PRIOR HISTORY: Essential Hypertension, Transient Ischemic Attack, Acute Kidney Injury    PROCEDURE: The procedure was performed at the bedside  This was a routine study  The transthoracic approach was used  The study included complete 2D imaging, M-mode, complete spectral Doppler, and color Doppler  The heart rate was 74 bpm,  at the start of the study  Images were obtained from the parasternal, apical, subcostal, and suprasternal notch acoustic windows  Echocardiographic views were limited due to poor acoustic window availability, decreased penetration, and  lung interference  This was a technically difficult study      LEFT VENTRICLE: Size was normal  Systolic function was normal  Ejection fraction was estimated in the range of 55 % to 65 %  There were no regional wall motion abnormalities  Wall thickness was normal  DOPPLER: Doppler parameters were  consistent with abnormal left ventricular relaxation (grade 1 diastolic dysfunction)  RIGHT VENTRICLE: The size was normal  Systolic function was normal  Wall thickness was normal     LEFT ATRIUM: The atrium was mildly dilated  RIGHT ATRIUM: The atrium was mildly dilated  MITRAL VALVE: Valve structure was normal  There was normal leaflet separation  DOPPLER: The transmitral velocity was within the normal range  There was no evidence for stenosis  There was mild regurgitation  AORTIC VALVE: The valve was trileaflet  Leaflets exhibited normal thickness and normal cuspal separation  DOPPLER: Transaortic velocity was within the normal range  There was no evidence for stenosis  There was no regurgitation  TRICUSPID VALVE: The valve structure was normal  There was normal leaflet separation  DOPPLER: The transtricuspid velocity was within the normal range  There was no evidence for stenosis  There was mild regurgitation  PULMONIC VALVE: Leaflets exhibited normal thickness, no calcification, and normal cuspal separation  DOPPLER: The transpulmonic velocity was within the normal range  There was no regurgitation  PERICARDIUM: There was no pericardial effusion  The pericardium was normal in appearance  AORTA: The root exhibited normal size      SYSTEM MEASUREMENT TABLES    2D  EF Biplane: 53 %  LA Area: 25 1 cm2  LVEDV MOD A2C: 117 8 ml  LVEDV MOD A4C: 167 7 ml  LVEDV MOD BP: 141 7 ml  LVEF MOD A2C: 47 2 %  LVEF MOD A4C: 57 3 %  LVESV MOD A2C: 62 2 ml  LVESV MOD A4C: 71 5 ml  LVESV MOD BP: 66 6 ml  LVLd A2C: 8 8 cm  LVLd A4C: 9 1 cm  LVLs A2C: 7 7 cm  LVLs A4C: 7 8 cm  RA Area: 24 6 cm2  RVIDd: 4 1 cm  SV MOD A2C: 55 6 ml  SV MOD A4C: 96 1 ml    MM  TAPSE: 3 5 cm    PW  MV A Taye: 1 1 m/s  MV Dec Lipscomb: 7 1 m/s2  MV DecT: 144 8 ms  MV E Taye: 1 m/s  MV E/A Ratio: 0 9  MV PHT: 42 ms  MVA By PHT: 5 2 cm2    [facility]    Prepared and electronically signed by    Meghan Sanchez MD  Signed 20-Mar-2018 08:13:40       No results found for this or any previous visit  No results found for this or any previous visit  No procedure found  No results found for this or any previous visit  Meds/Allergies   all current active meds have been reviewed  No prescriptions prior to admission  Assessment/Plan:  1  HTN urgency: BP improved but still elevated  Increase Coreg to 25 mg p o  b i d , continue Norvasc  If the blood pressure remains elevated, will consider Aldactone  Consider renal Doppler to rule out renal artery stenosis        2  Chest pain:   -Serial troponins neg x 6  -Resolved, likely was secondary to elevated BP   Antihypertensive medication changes as stated above  -ECHO shows EF 55-65%, no regional wall motion abnormalities, grade 2 DD  -Will consider ischemic workup when blood pressure normalizes either before discharge or as outpatient   -Continue ASA, statin, BB     3  Stroke-like symptoms: Per neurology: migraine vs TIA  Continue ASA, statin  Counseling / Coordination of Care  Total floor / unit time spent today 25 minutes  Greater than 50% of total time was spent with the patient and / or family counseling and / or coordination of care  ** Please Note: Dragon 360 Dictation voice to text software may have been used in the creation of this document   **

## 2018-03-24 VITALS
DIASTOLIC BLOOD PRESSURE: 63 MMHG | TEMPERATURE: 98.3 F | RESPIRATION RATE: 20 BRPM | HEIGHT: 68 IN | WEIGHT: 310.63 LBS | HEART RATE: 70 BPM | SYSTOLIC BLOOD PRESSURE: 135 MMHG | BODY MASS INDEX: 47.08 KG/M2 | OXYGEN SATURATION: 92 %

## 2018-03-24 PROBLEM — G45.9 TIA (TRANSIENT ISCHEMIC ATTACK): Status: RESOLVED | Noted: 2018-03-19 | Resolved: 2018-03-24

## 2018-03-24 PROBLEM — I21.4 NSTEMI (NON-ST ELEVATED MYOCARDIAL INFARCTION) (HCC): Status: RESOLVED | Noted: 2018-03-21 | Resolved: 2018-03-24

## 2018-03-24 PROBLEM — R06.89 RESPIRATORY INSUFFICIENCY: Status: RESOLVED | Noted: 2018-03-21 | Resolved: 2018-03-24

## 2018-03-24 PROBLEM — N17.9 AKI (ACUTE KIDNEY INJURY) (HCC): Status: RESOLVED | Noted: 2018-03-19 | Resolved: 2018-03-24

## 2018-03-24 LAB

## 2018-03-24 PROCEDURE — 99239 HOSP IP/OBS DSCHRG MGMT >30: CPT | Performed by: GENERAL PRACTICE

## 2018-03-24 RX ORDER — CARVEDILOL 25 MG/1
25 TABLET ORAL 2 TIMES DAILY WITH MEALS
Qty: 60 TABLET | Refills: 0 | Status: SHIPPED | OUTPATIENT
Start: 2018-03-25 | End: 2018-03-27 | Stop reason: SDUPTHER

## 2018-03-24 RX ORDER — TOPIRAMATE 25 MG/1
25 TABLET ORAL
Qty: 30 TABLET | Refills: 0 | Status: SHIPPED | OUTPATIENT
Start: 2018-03-24

## 2018-03-24 RX ORDER — BUTALBITAL, ACETAMINOPHEN AND CAFFEINE 50; 325; 40 MG/1; MG/1; MG/1
1 TABLET ORAL 2 TIMES DAILY PRN
Qty: 10 TABLET | Refills: 0 | Status: SHIPPED | OUTPATIENT
Start: 2018-03-24

## 2018-03-24 RX ORDER — ATORVASTATIN CALCIUM 40 MG/1
40 TABLET, FILM COATED ORAL EVERY EVENING
Qty: 30 TABLET | Refills: 0 | Status: SHIPPED | OUTPATIENT
Start: 2018-03-24 | End: 2018-03-27 | Stop reason: SDUPTHER

## 2018-03-24 RX ORDER — SERTRALINE HYDROCHLORIDE 25 MG/1
25 TABLET, FILM COATED ORAL DAILY
Qty: 30 TABLET | Refills: 0 | Status: SHIPPED | OUTPATIENT
Start: 2018-03-25

## 2018-03-24 RX ORDER — ASPIRIN 325 MG
325 TABLET ORAL DAILY
Qty: 30 TABLET | Refills: 0 | Status: SHIPPED | OUTPATIENT
Start: 2018-03-25

## 2018-03-24 RX ORDER — AMLODIPINE BESYLATE 10 MG/1
10 TABLET ORAL DAILY
Qty: 30 TABLET | Refills: 0 | Status: SHIPPED | OUTPATIENT
Start: 2018-03-25 | End: 2018-03-27 | Stop reason: SDUPTHER

## 2018-03-24 RX ADMIN — SERTRALINE HYDROCHLORIDE 25 MG: 25 TABLET ORAL at 10:44

## 2018-03-24 RX ADMIN — CARVEDILOL 25 MG: 12.5 TABLET, FILM COATED ORAL at 15:34

## 2018-03-24 RX ADMIN — BUTALBITAL, ACETAMINOPHEN AND CAFFEINE 1 TABLET: 50; 325; 40 TABLET ORAL at 10:46

## 2018-03-24 RX ADMIN — ACETAMINOPHEN 650 MG: 325 TABLET, FILM COATED ORAL at 00:50

## 2018-03-24 RX ADMIN — ACETAMINOPHEN 650 MG: 325 TABLET, FILM COATED ORAL at 06:27

## 2018-03-24 RX ADMIN — ATORVASTATIN CALCIUM 40 MG: 40 TABLET, FILM COATED ORAL at 17:27

## 2018-03-24 RX ADMIN — HEPARIN SODIUM 5000 UNITS: 5000 INJECTION, SOLUTION INTRAVENOUS; SUBCUTANEOUS at 13:48

## 2018-03-24 RX ADMIN — ASPIRIN 325 MG: 325 TABLET ORAL at 10:44

## 2018-03-24 RX ADMIN — CARVEDILOL 25 MG: 12.5 TABLET, FILM COATED ORAL at 07:58

## 2018-03-24 RX ADMIN — HEPARIN SODIUM 5000 UNITS: 5000 INJECTION, SOLUTION INTRAVENOUS; SUBCUTANEOUS at 06:28

## 2018-03-24 RX ADMIN — AMLODIPINE BESYLATE 10 MG: 10 TABLET ORAL at 10:44

## 2018-03-24 NOTE — DISCHARGE SUMMARY
Discharge Summary - [facility] Internal Medicine    Patient Information: Chris Moscoso 74 y o  male MRN: [id number]  Unit/Bed#: -01 Encounter: [id number]    Discharging Physician / Practitioner: Donis Vang MD  PCP: No primary care provider on file  Admission Date: 3/19/2018  Discharge Date: 03/24/18    Reason for Admission: tia , HTN urgency,    Discharge Diagnoses:     Principal Problem (Resolved):    TIA (transient ischemic attack)  Active Problems:    Essential hypertension    Chronic diastolic CHF (congestive heart failure) ([facility] [address] )  Resolved Problems:    VASQUEZ (acute kidney injury) ([facility] )    Respiratory insufficiency    NSTEMI (non-ST elevated myocardial infarction) [facility])      Consultations During Hospital Stay:  · Cardiology, ICU    Procedures Performed:     ·     Significant Findings:     · tia vs migraine    Incidental Findings:   · Sleep apnea     Test Results Pending at Discharge (will require follow up):   ·      Outpatient Tests Requested:  · Stress test-follow up with cardiology  · Sleep study-follow up with pulmonary medicine  · Headache-follow up with neurology   · Thyroid ultrasound to evaluate left lobe of thyroid nodule    Complications:     Hospital Course:     Chris Moscoso is a 74 y o  male patient who originally presented to the hospital on 3/19/2018 due to numbness  Needs thyroid us as outpatient upon discharge for left thyroid lobe nodule           Migraine with hemisensory dysfunction vs tia   Assessment & Plan      MRI brain without acute findings   cta reviewed  Neurology consult appreciated  Consider migraine prophylaxis; esr-5, lilly and lyme titier-igg 0 85; lyme western blot negative          Essential hypertension   Assessment & Plan     Was on ntg gtt; now on norvasc and coreg with adjust meds with elevation          VASQUEZ (acute kidney injury) ([facility] )   Assessment & Plan     Resolved           NSTEMI (non-ST elevated myocardial infarction) [facility])   Assessment & Plan     No regional wall motion abnormalities on echo   Likely type 2 from hypertension  Cardiology following  On coreg   bp improving  Can consider renal artery us       Chronic diastolic CHF (congestive heart failure) ([address] )   Assessment & Plan     Without acute exacerbation   Echocardiogram yesterday showing ejection fraction of 55-65%, no regional wall motion abnormalities, grade 1 diastolic dysfunction           Respiratory insufficiency   Assessment & Plan     liklely with sleep apnea will need outpatient sleep study            Condition at Discharge: stable     Discharge Day Visit / Exam:     * Please refer to separate progress for these details *    Discharge instructions/Information to patient and family:   See after visit summary for information provided to patient and family  Provisions for Follow-Up Care:  See after visit summary for information related to follow-up care and any pertinent home health orders  Disposition:     Home    For Discharges to [facility] SNF:   · Not Applicable to this Patient - Not Applicable to this Patient    Planned Readmission:     Discharge Statement:  I spent 40 minutes discharging the patient  This time was spent on the day of discharge  I had direct contact with the patient on the day of discharge  Greater than 50% of the total time was spent examining patient, answering all patient questions, arranging and discussing plan of care with patient as well as directly providing post-discharge instructions  Additional time then spent on discharge activities  Discharge Medications:  See after visit summary for reconciled discharge medications provided to patient and family  ** Please Note: Dragon 360 Dictation voice to text software may have been used in the creation of this document   **

## 2018-03-24 NOTE — PLAN OF CARE
Problem: DISCHARGE PLANNING - CARE MANAGEMENT  Goal: Discharge to post-acute care or home with appropriate resources  INTERVENTIONS:  - Conduct assessment to determine patient/family and health care team treatment goals, and need for post-acute services based on payer coverage, community resources, and patient preferences, and barriers to discharge  - Address psychosocial, clinical, and financial barriers to discharge as identified in assessment in conjunction with the patient/family and health care team  - Arrange appropriate level of post-acute services according to patient's   needs and preference and payer coverage in collaboration with the physician and health care team  - Communicate with and update the patient/family, physician, and health care team regarding progress on the discharge plan  - Arrange appropriate transportation to post-acute venues    Outcome: Completed Date Met: 03/24/18  PT TO BE DISCHARGED HOME W/NO NEEDS  SUPERVISOR NOTIFIED AND WILL PROVIDE TAXI VOUCHER    PT HAS NO MONEY AND HAS NO ONE AVAILABLE TO TAKE HIM HOME

## 2018-03-24 NOTE — PROGRESS NOTES
[facility] Progress Note  Patient: Leslie Arcos 74 y o  male   MRN: [id number]  PCP: No primary care provider on file  Unit/Bed#: -01 Encounter: [id number]  Date Of Visit: 03/24/18    Assessment:    Principal Problem:    TIA (transient ischemic attack)  Active Problems:    AVSQUEZ (acute kidney injury) ([facility] )    Essential hypertension    Respiratory insufficiency    Chronic diastolic CHF (congestive heart failure) ([facility])    NSTEMI (non-ST elevated myocardial infarction) ([facility] )      Plan:    Needs thyroid us as outpatient upon discharge for left thyroid lobe nodule         Migraine with hemisensory dysfunction vs tia   Assessment & Plan      MRI brain without acute findings  cta reviewed  Neurology consult appreciated  Consider migraine prophylaxis; esr-5, lilly and lyme titier-igg 0 85; lyme western blot pending          Essential hypertension   Assessment & Plan     Was on ntg gtt; now on norvasc and coreg with adjust meds with elevation          VASQUEZ (acute kidney injury) ([place] [address] )   Assessment & Plan     Resolved           NSTEMI (non-ST elevated myocardial infarction) ([place] [address] )   Assessment & Plan     No regional wall motion abnormalities on echo   Likely type 2 from hypertension  Cardiology following  On coreg   bp improving  Can consider renal artery us       Chronic diastolic CHF (congestive heart failure) ([place] [address] )   Assessment & Plan     Without acute exacerbation   Echocardiogram yesterday showing ejection fraction of 55-65%, no regional wall motion abnormalities, grade 1 diastolic dysfunction           Respiratory insufficiency   Assessment & Plan     liklely with sleep apnea will need outpatient sleep study                            VTE Pharmacologic Prophylaxis:   Pharmacologic: Heparin  Mechanical VTE Prophylaxis in Place: Yes    Patient Centered Rounds: I have performed bedside rounds with nursing staff today      Discussions with Specialists or Other Care Team Provider:     Education and Discussions with Family / Patient:     Time Spent for Care: 30 minutes  More than 50% of total time spent on counseling and coordination of care as described above  Current Length of Stay: 4 day(s)    Current Patient Status: Inpatient   Certification Statement: The patient will continue to require additional inpatient hospital stay due to possible discharge today    Discharge Plan: home    Code Status: Level 1 - Full Code      Subjective:   No chest pain or shortness of breath    Objective:     Vitals:   Temp (24hrs), Av 8 °F (36 6 °C), Min:97 6 °F (36 4 °C), Max:98 2 °F (36 8 °C)    HR:  [74-79] 74  Resp:  [20] 20  BP: (141-180)/(67-86) 170/79  SpO2:  [92 %-95 %] 92 %  Body mass index is 47 23 kg/m²  Input and Output Summary (last 24 hours):     No intake or output data in the 24 hours ending 18 0934    Physical Exam:     Physical Exam   Constitutional: He appears well-developed and well-nourished  HENT:   Head: Normocephalic and atraumatic  Cardiovascular: Normal rate and regular rhythm  Pulmonary/Chest: Effort normal and breath sounds normal    Musculoskeletal: He exhibits edema  Additional Data:     Labs:      Results from last 7 days  Lab Units 18  0503   WBC Thousand/uL 8 77   HEMOGLOBIN g/dL 15 6   HEMATOCRIT % 46 7   PLATELETS Thousands/uL 225   NEUTROS PCT % 60   LYMPHS PCT % 23   MONOS PCT % 15*   EOS PCT % 2       Results from last 7 days  Lab Units 18  0502 18  0437   SODIUM mmol/L 141 140   POTASSIUM mmol/L 3 7 3 9   CHLORIDE mmol/L 105 103   CO2 mmol/L 27 28   BUN mg/dL 15 20   CREATININE mg/dL 0 98 1 14   CALCIUM mg/dL 8 9 8 9   TOTAL PROTEIN g/dL  --  6 5   BILIRUBIN TOTAL mg/dL  --  0 70   ALK PHOS U/L  --  52   ALT U/L  --  28   AST U/L  --  16   GLUCOSE RANDOM mg/dL 109 136       Results from last 7 days  Lab Units 18  0104   INR  0 92       * I Have Reviewed All Lab Data Listed Above  * Additional Pertinent Lab Tests Reviewed:  All Labs Within Last 24 Hours Reviewed    Imaging:    Imaging Reports Reviewed Today Include:   Imaging Personally Reviewed by Myself Includes:      Recent Cultures (last 7 days):           Last 24 Hours Medication List:     Current Facility-Administered Medications:  acetaminophen 650 mg Oral Q6H [facility] Odettevernon GalvezCHRISTOPHER   acetaminophen 650 mg Oral Q4H PRN Stevo Workman MD   amLODIPine 10 mg Oral Daily Raymundo Cabrera PA-C   aspirin 325 mg Oral Daily Wilma Lomeli PA-C   atorvastatin 40 mg Oral QPM Wilma Lomeli PA-C   butalbital-acetaminophen-caffeine 1 tablet Oral BID PRN Stevo Workman MD   carvedilol 25 mg Oral BID With Meals Belkis Arevalo MD   heparin (porcine) 5,000 Units Subcutaneous [facility] Wilma Lomeli PA-C   [facility] Hold] hydrALAZINE 5 mg Intravenous Q6H PRN Maximilian Neumann PA-C   nitroglycerin 0 4 mg Sublingual Q5 Min PRN Neha Charles PA-C   ondansetron 4 mg Intravenous Q6H PRN Wilma Lomeli PA-C   sertraline 25 mg Oral Daily Stevo Workman MD   topiramate 25 mg Oral HS Stevo Workman MD        Today, Patient Was Seen By: Antoinette Santos MD    ** Please Note: Dragon 360 Dictation voice to text software may have been used in the creation of this document   **

## 2018-03-24 NOTE — SOCIAL WORK
PT TO BE DISCHARGED HOME W/NO NEEDS  SUPERVISOR NOTIFIED AND WILL PROVIDE TAXI VOUCHER    PT HAS NO MONEY AND HAS NO ONE AVAILABLE TO TAKE HIM HOME

## 2018-03-26 ENCOUNTER — TELEPHONE (OUTPATIENT)
Dept: CARDIOLOGY CLINIC | Facility: CLINIC | Age: 68
End: 2018-03-26

## 2018-03-26 DIAGNOSIS — E78.2 MIXED HYPERLIPIDEMIA: ICD-10-CM

## 2018-03-26 DIAGNOSIS — R20.0 LEFT SIDED NUMBNESS: ICD-10-CM

## 2018-03-26 DIAGNOSIS — I10 ESSENTIAL HYPERTENSION: Primary | ICD-10-CM

## 2018-03-26 LAB — RYE IGE QN: NEGATIVE

## 2018-03-26 RX ORDER — CARVEDILOL 25 MG/1
25 TABLET ORAL 2 TIMES DAILY WITH MEALS
Qty: 60 TABLET | Refills: 5 | Status: CANCELLED | OUTPATIENT
Start: 2018-03-26

## 2018-03-26 RX ORDER — AMLODIPINE BESYLATE 10 MG/1
10 TABLET ORAL DAILY
Qty: 30 TABLET | Refills: 5 | Status: CANCELLED | OUTPATIENT
Start: 2018-03-26

## 2018-03-26 RX ORDER — ATORVASTATIN CALCIUM 40 MG/1
40 TABLET, FILM COATED ORAL EVERY EVENING
Qty: 30 TABLET | Refills: 5 | Status: CANCELLED | OUTPATIENT
Start: 2018-03-26

## 2018-03-26 NOTE — TELEPHONE ENCOUNTER
Patient cannot wait till end of April, he was very sick in the hospital he is to see me this week  Please make an appointment with me on Wednesday/ Thursday this week   thank you

## 2018-03-26 NOTE — TELEPHONE ENCOUNTER
PT WAS IN THE HOSP & D/C OVER THE WEEKEND & WAS IN FOR BP ISSUES BUT SINCE BEING ON THE MEDS HE WAS PUT ON IN THE HOSP, PT FEELS GREAT & WAS OK W/ WAITING TIL THE END OF APRIL FOR THE HOSP F/UP BUT NEEDS A REFILL ON ALL THE MEDS THAT WAS PRESCRIBED IN THE HOSP SENT TO [facility]

## 2018-03-27 DIAGNOSIS — E78.2 MIXED HYPERLIPIDEMIA: ICD-10-CM

## 2018-03-27 DIAGNOSIS — I10 ESSENTIAL HYPERTENSION: Primary | ICD-10-CM

## 2018-03-27 DIAGNOSIS — R20.0 LEFT SIDED NUMBNESS: ICD-10-CM

## 2018-03-27 RX ORDER — ATORVASTATIN CALCIUM 40 MG/1
40 TABLET, FILM COATED ORAL EVERY EVENING
Qty: 30 TABLET | Refills: 5 | Status: SHIPPED | OUTPATIENT
Start: 2018-03-27

## 2018-03-27 RX ORDER — AMLODIPINE BESYLATE 10 MG/1
10 TABLET ORAL DAILY
Qty: 30 TABLET | Refills: 5 | Status: SHIPPED | OUTPATIENT
Start: 2018-03-27

## 2018-03-27 RX ORDER — CARVEDILOL 25 MG/1
25 TABLET ORAL 2 TIMES DAILY WITH MEALS
Qty: 60 TABLET | Refills: 5 | Status: SHIPPED | OUTPATIENT
Start: 2018-03-27

## 2018-03-29 ENCOUNTER — OFFICE VISIT (OUTPATIENT)
Dept: CARDIOLOGY CLINIC | Facility: CLINIC | Age: 68
End: 2018-03-29
Payer: COMMERCIAL

## 2018-03-29 VITALS
HEIGHT: 68 IN | WEIGHT: 302.4 LBS | OXYGEN SATURATION: 98 % | BODY MASS INDEX: 45.83 KG/M2 | SYSTOLIC BLOOD PRESSURE: 152 MMHG | HEART RATE: 66 BPM | DIASTOLIC BLOOD PRESSURE: 100 MMHG

## 2018-03-29 DIAGNOSIS — E78.5 HYPERLIPIDEMIA, UNSPECIFIED HYPERLIPIDEMIA TYPE: ICD-10-CM

## 2018-03-29 DIAGNOSIS — I50.32 CHRONIC DIASTOLIC CHF (CONGESTIVE HEART FAILURE) (HCC): ICD-10-CM

## 2018-03-29 DIAGNOSIS — R94.31 ABNORMAL EKG: ICD-10-CM

## 2018-03-29 DIAGNOSIS — I10 ESSENTIAL HYPERTENSION: Chronic | ICD-10-CM

## 2018-03-29 DIAGNOSIS — I25.9 CHEST PAIN DUE TO MYOCARDIAL ISCHEMIA, UNSPECIFIED ISCHEMIC CHEST PAIN TYPE: ICD-10-CM

## 2018-03-29 DIAGNOSIS — I10 HYPERTENSION, UNSPECIFIED TYPE: Primary | ICD-10-CM

## 2018-03-29 PROCEDURE — 99214 OFFICE O/P EST MOD 30 MIN: CPT | Performed by: INTERNAL MEDICINE

## 2018-03-29 RX ORDER — CHLORTHALIDONE 25 MG/1
25 TABLET ORAL DAILY
Qty: 90 TABLET | Refills: 3 | Status: SHIPPED | OUTPATIENT
Start: 2018-03-29

## 2018-03-29 NOTE — PROGRESS NOTES
[facility]  [address]  [facility] [id number]  Cardiology Consultation     [facility]  [id number]  1950      1  Hypertension, unspecified type  chlorthalidone 25 mg tablet   2  Hyperlipidemia, unspecified hyperlipidemia type     3  Abnormal EKG  NM myocardial perfusion spect (rx stress and/or rest)       Chief Complaint   Patient presents with    Follow-up     s/p hospital       HPI:  Patient with history of CKD3, HTN, HLD presents for hospital follow up for HTN emergency and chest pain accompanied by stroke-like symptoms  Serial troponins were negative x6, chest pain has resolved with BP control  ECHO shoqed EF 55-65%, no regional wall motion abnormalities, grade 1 DD   He did have ischemic changes on EKG in the hospital, and cardiology decided that he would need ischemic workup  Patient's main complaint is fatigue, but also notes confusion since starting SSRI in the hospital  Denies chest pain, SOB, headache, stroke-like symptoms, palpitations, lightheadedness, syncope  Has occasional leg swelling which has resolved with a low salt diet  Patient does NOT have prior history of chronic diastolic CHF  Patient Active Problem List   Diagnosis    Essential hypertension    Chronic diastolic CHF (congestive heart failure) ([facility])     Past Medical History:   Diagnosis Date    [facility]     Chronic diastolic CHF (congestive heart failure) ([facility])     Hyperlipidemia     Hypertension     NSTEMI (non-ST elevated myocardial infarction) ([facility] )     TIA (transient ischemic attack)      Social History     Social History    Marital status: /Civil Union     Spouse name: N/A    Number of children: N/A    Years of education: N/A     Occupational History    Not on file       Social History Main Topics    Smoking status: Never Smoker    Smokeless tobacco: Never Used    Alcohol use No    Drug use: No    Sexual activity: Not on file     Other Topics Concern    Not on file     Social History Narrative    No narrative on file      History reviewed  No pertinent family history  History reviewed  No pertinent surgical history      Current Outpatient Prescriptions:     amLODIPine (NORVASC) 10 mg tablet, Take 1 tablet (10 mg total) by mouth daily, Disp: 30 tablet, Rfl: 5    aspirin 325 mg tablet, Take 1 tablet (325 mg total) by mouth daily, Disp: 30 tablet, Rfl: 0    atorvastatin (LIPITOR) 40 mg tablet, Take 1 tablet (40 mg total) by mouth every evening, Disp: 30 tablet, Rfl: 5    butalbital-acetaminophen-caffeine (FIORICET,ESGIC) -40 mg per tablet, Take 1 tablet by mouth 2 (two) times a day as needed for headaches, Disp: 10 tablet, Rfl: 0    carvedilol (COREG) 25 mg tablet, Take 1 tablet (25 mg total) by mouth 2 (two) times a day with meals, Disp: 60 tablet, Rfl: 5    sertraline (ZOLOFT) 25 mg tablet, Take 1 tablet (25 mg total) by mouth daily, Disp: 30 tablet, Rfl: 0    topiramate (TOPAMAX) 25 mg tablet, Take 1 tablet (25 mg total) by mouth daily at bedtime, Disp: 30 tablet, Rfl: 0  Allergies   Allergen Reactions    Nitroglycerin Headache    Valium [Diazepam] Dizziness     Vitals:    03/29/18 1636   BP: 152/100   Pulse: 66   SpO2: 98%   Weight: (!) 137 kg (302 lb 6 4 oz)   Height: 5' 8" (1 727 m)       Labs:  Admission on 03/19/2018, Discharged on 03/24/2018   Component Date Value    PTT 03/19/2018 27     WBC 03/19/2018 9 85     RBC 03/19/2018 5 42     Hemoglobin 03/19/2018 17 3*    Hematocrit 03/19/2018 52 2*    MCV 03/19/2018 96     MCH 03/19/2018 31 9     MCHC 03/19/2018 33 1     RDW 03/19/2018 13 4     Platelets 86/51/7269 250     MPV 03/19/2018 10 2     Protime 03/19/2018 12 6     INR 03/19/2018 0 92     Troponin I 03/19/2018 <0 02     Sodium 03/19/2018 138     Potassium 03/19/2018 4 1     Chloride 03/19/2018 98*    CO2 03/19/2018 30     Anion Gap 03/19/2018 10     BUN 03/19/2018 23     Creatinine 03/19/2018 1 41*    Glucose 03/19/2018 136     Calcium 03/19/2018 10 1     AST 03/19/2018 24     ALT 03/19/2018 35     Alkaline Phosphatase 03/19/2018 67     Total Protein 03/19/2018 8 4*    Albumin 03/19/2018 4 4     Total Bilirubin 03/19/2018 0 80     eGFR 03/19/2018 52     Cholesterol 03/19/2018 234*    Triglycerides 03/19/2018 74     HDL, Direct 03/19/2018 43     LDL Calculated 03/19/2018 176*    Hemoglobin A1C 03/19/2018 6 0     EAG 03/19/2018 126     Sodium 03/19/2018 138     Potassium 03/19/2018 4 3     Chloride 03/19/2018 100     CO2 03/19/2018 29     Anion Gap 03/19/2018 9     BUN 03/19/2018 21     Creatinine 03/19/2018 1 24     Glucose 03/19/2018 135     Calcium 03/19/2018 9 3     eGFR 03/19/2018 61     WBC 03/19/2018 8 06     RBC 03/19/2018 5 02     Hemoglobin 03/19/2018 16 2     Hematocrit 03/19/2018 47 7     MCV 03/19/2018 95     MCH 03/19/2018 32 3     MCHC 03/19/2018 34 0     RDW 03/19/2018 13 5     Platelets 45/99/0501 220     MPV 03/19/2018 10 3     Platelets 74/25/3641 220     MPV 03/19/2018 10 3     Sed Rate 03/19/2018 2     Troponin I 03/19/2018 <0 02     Sodium 03/19/2018 137     Potassium 03/19/2018 4 5     Chloride 03/19/2018 100     CO2 03/19/2018 26     Anion Gap 03/19/2018 11     BUN 03/19/2018 19     Creatinine 03/19/2018 1 11     Glucose 03/19/2018 121     Calcium 03/19/2018 10 0     eGFR 03/19/2018 69     Troponin I 03/19/2018 <0 02     Troponin I 03/19/2018 0 04     Troponin I 03/20/2018 0 03     Ventricular Rate 03/19/2018 80     Atrial Rate 03/19/2018 80     WI Interval 03/19/2018 162     QRSD Interval 03/19/2018 108     QT Interval 03/19/2018 412     QTC Interval 03/19/2018 475     P Axis 03/19/2018 47     QRS Axis 03/19/2018 56     T Wave Axis 03/19/2018 -39     Ventricular Rate 03/19/2018 64     Atrial Rate 03/19/2018 64     WI Interval 03/19/2018 118     QRSD Interval 03/19/2018 110     QT Interval 03/19/2018 448     QTC Interval 03/19/2018 462     P Axis 03/19/2018 10     QRS Axis 03/19/2018 44     T Wave Axis 03/19/2018 237     Ventricular Rate 03/19/2018 69     Atrial Rate 03/19/2018 69     UT Interval 03/19/2018 170     QRSD Interval 03/19/2018 116     QT Interval 03/19/2018 436     QTC Interval 03/19/2018 467     P Axis 03/19/2018 56     QRS Axis 03/19/2018 5     T Wave Axis 03/19/2018 103     Troponin I 03/20/2018 0 03     Troponin I 03/20/2018 0 03     Sodium 03/20/2018 140     Potassium 03/20/2018 3 9     Chloride 03/20/2018 103     CO2 03/20/2018 28     Anion Gap 03/20/2018 9     BUN 03/20/2018 20     Creatinine 03/20/2018 1 14     Glucose 03/20/2018 136     Glucose, Fasting 03/20/2018 136*    Calcium 03/20/2018 8 9     AST 03/20/2018 16     ALT 03/20/2018 28     Alkaline Phosphatase 03/20/2018 52     Total Protein 03/20/2018 6 5     Albumin 03/20/2018 3 4*    Total Bilirubin 03/20/2018 0 70     eGFR 03/20/2018 67     TSH 3RD GENERATON 03/20/2018 0 843     Free T4 03/20/2018 1 09     WBC 03/20/2018 8 35     RBC 03/20/2018 4 65     Hemoglobin 03/20/2018 15 0     Hematocrit 03/20/2018 44 5     MCV 03/20/2018 96     MCH 03/20/2018 32 3     MCHC 03/20/2018 33 7     RDW 03/20/2018 13 9     MPV 03/20/2018 9 8     Platelets 82/70/9688 226     nRBC 03/20/2018 0     Neutrophils Relative 03/20/2018 70     Lymphocytes Relative 03/20/2018 16     Monocytes Relative 03/20/2018 13*    Eosinophils Relative 03/20/2018 0     Basophils Relative 03/20/2018 0     Neutrophils Absolute 03/20/2018 5 84     Lymphocytes Absolute 03/20/2018 1 35     Monocytes Absolute 03/20/2018 1 10     Eosinophils Absolute 03/20/2018 0 01     Basophils Absolute 03/20/2018 0 03     WBC 03/21/2018 8 77     RBC 03/21/2018 4 83     Hemoglobin 03/21/2018 15 6     Hematocrit 03/21/2018 46 7     MCV 03/21/2018 97     MCH 03/21/2018 32 3     MCHC 03/21/2018 33 4     RDW 03/21/2018 13 9     MPV 03/21/2018 10 4     Platelets 09/87/7576 225     nRBC 03/21/2018 0     Neutrophils Relative 03/21/2018 60     Lymphocytes Relative 03/21/2018 23     Monocytes Relative 03/21/2018 15*    Eosinophils Relative 03/21/2018 2     Basophils Relative 03/21/2018 1     Neutrophils Absolute 03/21/2018 5 26     Lymphocytes Absolute 03/21/2018 1 97     Monocytes Absolute 03/21/2018 1 27*    Eosinophils Absolute 03/21/2018 0 19     Basophils Absolute 03/21/2018 0 05     Magnesium 03/21/2018 2 1     Phosphorus 03/21/2018 2 7     Sodium 03/21/2018 141     Potassium 03/21/2018 3 7     Chloride 03/21/2018 105     CO2 03/21/2018 27     Anion Gap 03/21/2018 9     BUN 03/21/2018 15     Creatinine 03/21/2018 0 98     Glucose 03/21/2018 109     Calcium 03/21/2018 8 9     eGFR 03/21/2018 81     POC Glucose 03/21/2018 99     Sed Rate 03/22/2018 5     LYME AB IGG 03/22/2018 0 85*    LYME AB IGM 03/22/2018 0 68     FAYE 03/23/2018 Negative     Lyme 18 kD IgG 03/22/2018 Absent     Lyme 23 kD IgG 03/22/2018 Absent     Lyme 28 kD IgG 03/22/2018 Absent     Lyme 30 kD IgG 03/22/2018 Present*    Lyme 39 kD IgG 03/22/2018 Absent     Lyme 41 kD IgG 03/22/2018 Absent     Lyme 45 kD IgG 03/22/2018 Absent     Lyme 58 kD IgG 03/22/2018 Absent     Lyme 66 kD IgG 03/22/2018 Absent     Lyme 93 kD IgG 03/22/2018 Absent     Lyme 23 kD IgM 03/22/2018 Absent     Lyme 39 kD IgM 03/22/2018 Absent     Lyme 41 kD IgM 03/22/2018 Absent     Lyme IgG WB Interp  03/22/2018 Negative     Lyme IgM WB Interp  03/22/2018 Negative      Imaging: Cta Head And Neck With And Without Contrast    Result Date: 3/19/2018  Narrative: CTA NECK AND BRAIN WITH AND WITHOUT CONTRAST INDICATION: Headache  Face and hand numbness  COMPARISON:   None  TECHNIQUE:  Routine CT imaging of the Brain without contrast   Post contrast imaging was performed after administration of iodinated contrast through the neck and brain   Post contrast axial 0 625 mm images timed to opacify the arterial system  3D rendering was performed on an independent workstation  MIP reconstructions performed  Coronal reconstructions were performed of the noncontrast portion of the brain  Radiation dose length product (DLP) for this visit:  3037 mGy-cm   This examination, like all CT scans performed in the [facility], was performed utilizing techniques to minimize radiation dose exposure, including the use of iterative reconstruction and automated exposure control  IV Contrast:  100 mL of iodixanol (VISIPAQUE)  IMAGE QUALITY:   Diagnostic FINDINGS: NONCONTRAST BRAIN PARENCHYMA:  No intracranial mass, mass effect or midline shift  No acute intracranial hemorrhage  No CT signs of acute infarction  VENTRICLES AND EXTRA-AXIAL SPACES:  Normal for patient's age  VISUALIZED ORBITS AND PARANASAL SINUSES:  Unremarkable  CALVARIUM AND EXTRACRANIAL SOFT TISSUES:   Normal  CERVICAL VASCULATURE AORTIC ARCH AND GREAT VESSELS:  Normal aortic arch and great vessel origins  Normal visualized subclavian vessels  RIGHT VERTEBRAL ARTERY CERVICAL SEGMENT:  Normal origin  The vessel is normal in caliber throughout the neck  LEFT VERTEBRAL ARTERY CERVICAL SEGMENT:  Mild stenosis at the origin  The vessel is normal in caliber throughout the neck  RIGHT EXTRACRANIAL CAROTID SEGMENT:  Normal caliber common carotid artery  Normal bifurcation  There is mild atherosclerotic plaquing at the carotid bulb causing no hemodynamically significant stenosis  No dissection  LEFT EXTRACRANIAL CAROTID SEGMENT:  Normal caliber common carotid artery  Normal bifurcation  There is mild atherosclerotic plaquing at the carotid bulb causing no hemodynamically significant stenosis  No dissection  NASCET criteria was used to determine the degree of internal carotid artery diameter stenosis   INTRACRANIAL VASCULATURE INTERNAL CAROTID ARTERIES:  There is bilateral intracranial ICA atherosclerotic plaquing with multifocal mild stenosis, measuring up to 30% at the level of the right clinoid ICA  Normal ophthalmic artery origins  Normal ICA terminus  ANTERIOR CIRCULATION:  Symmetric A1 segments and anterior cerebral arteries with normal enhancement  Normal anterior communicating artery  MIDDLE CEREBRAL ARTERY CIRCULATION:  M1 segment and middle cerebral artery branches demonstrate normal enhancement bilaterally  DISTAL VERTEBRAL ARTERIES:  Normal distal vertebral arteries  Posterior inferior cerebellar artery origins are normal  Normal vertebral basilar junction  BASILAR ARTERY:  Basilar artery is normal in caliber  Normal superior cerebellar arteries  POSTERIOR CEREBRAL ARTERIES: Normal posterior communicating arteries  DURAL VENOUS SINUSES:  Normal  NON VASCULAR ANATOMY BONY STRUCTURES:  No acute osseous abnormality  SOFT TISSUES OF THE NECK: There is a 22 mm left thyroid lobe nodule  Incidental discovery of one or more thyroid nodule(s) measuring more than 1 5 cm and without suspicious features is noted in this patient who is above 28years old; according to guidelines published in the February 2015 white paper on incidental thyroid nodules in the Journal of the [employer] of Radiology Nikia Brock), further characterization with thyroid ultrasound is recommended  THORACIC INLET:  Unremarkable  Impression: No acute intracranial abnormality  No focal stenosis or saccular aneurysm within the [place]  No hemodynamically significant stenosis within either common or internal carotid artery  Less than 50% stenosis by NASCET criteria  22 mm left thyroid lobe nodule  A nonemergent thyroid ultrasound is recommended for further characterization  No prior studies are available for comparison  Findings are consistent with the preliminary report from [facility] which was provided shortly after completion of the exam  The additional finding of the left thyroid lobe nodule will be communicated by the radiology  liaison   Workstation performed: [id number]     X-ray Chest 2 Views    Result Date: 3/19/2018  Narrative: CHEST INDICATION:   stroke  COMPARISON:  None EXAM PERFORMED/VIEWS:  XR CHEST PA & LATERAL Images: 2 FINDINGS: Cardiomediastinal silhouette appears unremarkable  The lungs are clear  No pneumothorax or pleural effusion  Arthropathy of the bilateral acromioclavicular joints noted  Impression: No acute cardiopulmonary disease  Workstation performed: [id number]     Mri Brain Wo Contrast    Result Date: 3/20/2018  Narrative: MRI BRAIN WITHOUT CONTRAST INDICATION:  62-year-old male, facial paresthesias COMPARISON:   3/19/2018 CTA TECHNIQUE:  Sagittal T1, axial T2, axial FLAIR, axial T1, axial Baker and axial diffusion imaging  IMAGE QUALITY:  Diagnostic  Mild degree of patient motion degrades the examination FINDINGS: BRAIN PARENCHYMA:  There is no discrete mass, mass effect or midline shift  No abnormal white matter signal identified  Brainstem and cerebellum demonstrate normal signal  There is no intracranial hemorrhage  There is no evidence of acute infarction and  diffusion imaging is unremarkable  VENTRICLES:  The ventricles are normal in size and contour  SELLA AND PITUITARY GLAND:  Normal  ORBITS:  Normal  PARANASAL SINUSES:  Normal  VASCULATURE:  Evaluation of the major intracranial vasculature demonstrates appropriate flow voids  CALVARIUM AND SKULL BASE:  Normal  EXTRACRANIAL SOFT TISSUES:  Normal      Impression: No significant focal brain parenchymal abnormalities Consistent with prior CTA Workstation performed: [id number]       Review of Systems:  Review of Systems   Constitutional: Negative for diaphoresis, fatigue and fever  HENT: Negative for congestion, ear discharge, hearing loss, sinus pain and sore throat  Eyes: Negative for pain, redness and visual disturbance  Respiratory: Negative for cough, chest tightness, shortness of breath and wheezing      Cardiovascular: Negative for chest pain, palpitations and leg swelling  Gastrointestinal: Negative for abdominal distention, abdominal pain, constipation, diarrhea, nausea and vomiting  Endocrine: Negative for cold intolerance and heat intolerance  Genitourinary: Negative for difficulty urinating and hematuria  Musculoskeletal: Negative for arthralgias, back pain, gait problem, joint swelling, myalgias, neck pain and neck stiffness  Skin: Negative for color change, pallor, rash and wound  Neurological: Negative for dizziness, syncope, weakness, numbness and headaches  Hematological: Does not bruise/bleed easily  Psychiatric/Behavioral: Negative for agitation, behavioral problems and confusion  The patient is not nervous/anxious  /100   Pulse 66   Ht 5' 8" (1 727 m)   Wt (!) 137 kg (302 lb 6 4 oz)   SpO2 98%   BMI 45 98 kg/m²     Physical Exam:  Physical Exam   Constitutional: He is oriented to person, place, and time  He appears well-developed and well-nourished  HENT:   Head: Normocephalic and atraumatic  Eyes: Conjunctivae and EOM are normal  Pupils are equal, round, and reactive to light  Neck: Normal range of motion  Neck supple  Cardiovascular: Normal rate, regular rhythm, normal heart sounds and intact distal pulses  Exam reveals no gallop and no friction rub  No murmur heard  Pulmonary/Chest: Effort normal and breath sounds normal  No respiratory distress  He has no wheezes  He has no rales  He exhibits no tenderness  Abdominal: Soft  Bowel sounds are normal  He exhibits no distension  There is no rebound  Musculoskeletal: Normal range of motion  He exhibits no edema  Neurological: He is alert and oriented to person, place, and time  He has normal reflexes  Skin: Skin is warm and dry  Psychiatric: He has a normal mood and affect  His behavior is normal  Judgment and thought content normal        Discussion/Summary:  1  HTN: /100, repeat BP with larger cuff 505/51 systolic   Will start diuretic, chlorthalidone 25 mg daily  2  Chest pain, abnormal EKG: resolved  Will do stress test given ischemic changes on EKG in hospital     3  Fatigue, confusion: Possibly due to Zoloft as symptoms started after starting this medication  Patient only took this medication for 3 days, recommend to stop medication  Does not need taper due to short duration of taking this medication  Patient denies mood disorder  Told patient to contact physician who started this medication      F/u 6 weeks